# Patient Record
Sex: MALE | NOT HISPANIC OR LATINO | Employment: FULL TIME | URBAN - METROPOLITAN AREA
[De-identification: names, ages, dates, MRNs, and addresses within clinical notes are randomized per-mention and may not be internally consistent; named-entity substitution may affect disease eponyms.]

---

## 2017-08-16 DIAGNOSIS — E78.5 HYPERLIPIDEMIA LDL GOAL <130: ICD-10-CM

## 2017-08-16 RX ORDER — SIMVASTATIN 20 MG
20 TABLET ORAL AT BEDTIME
Qty: 30 TABLET | Refills: 0 | Status: SHIPPED | OUTPATIENT
Start: 2017-08-16 | End: 2017-09-12

## 2017-09-06 DIAGNOSIS — R73.9 HYPERGLYCEMIA: ICD-10-CM

## 2017-09-06 DIAGNOSIS — E78.5 HYPERLIPIDEMIA LDL GOAL <130: ICD-10-CM

## 2017-09-06 DIAGNOSIS — Z12.5 SCREENING FOR PROSTATE CANCER: ICD-10-CM

## 2017-09-06 DIAGNOSIS — Z00.00 ROUTINE GENERAL MEDICAL EXAMINATION AT A HEALTH CARE FACILITY: ICD-10-CM

## 2017-09-06 LAB
ALBUMIN SERPL-MCNC: 4 G/DL (ref 3.4–5)
ALBUMIN UR-MCNC: 30 MG/DL
ALP SERPL-CCNC: 63 U/L (ref 40–150)
ALT SERPL W P-5'-P-CCNC: 27 U/L (ref 0–70)
ANION GAP SERPL CALCULATED.3IONS-SCNC: 6 MMOL/L (ref 3–14)
APPEARANCE UR: CLEAR
AST SERPL W P-5'-P-CCNC: 17 U/L (ref 0–45)
BACTERIA #/AREA URNS HPF: ABNORMAL /HPF
BILIRUB SERPL-MCNC: 1.1 MG/DL (ref 0.2–1.3)
BILIRUB UR QL STRIP: ABNORMAL
BUN SERPL-MCNC: 10 MG/DL (ref 7–30)
CALCIUM SERPL-MCNC: 8.9 MG/DL (ref 8.5–10.1)
CHLORIDE SERPL-SCNC: 107 MMOL/L (ref 94–109)
CHOLEST SERPL-MCNC: 136 MG/DL
CO2 SERPL-SCNC: 28 MMOL/L (ref 20–32)
COLOR UR AUTO: YELLOW
CREAT SERPL-MCNC: 0.94 MG/DL (ref 0.66–1.25)
ERYTHROCYTE [DISTWIDTH] IN BLOOD BY AUTOMATED COUNT: 12.6 % (ref 10–15)
GFR SERPL CREATININE-BSD FRML MDRD: 83 ML/MIN/1.7M2
GLUCOSE SERPL-MCNC: 99 MG/DL (ref 70–99)
GLUCOSE UR STRIP-MCNC: NEGATIVE MG/DL
HBA1C MFR BLD: 5.9 % (ref 4.3–6)
HCT VFR BLD AUTO: 42.3 % (ref 40–53)
HDLC SERPL-MCNC: 38 MG/DL
HGB BLD-MCNC: 14.3 G/DL (ref 13.3–17.7)
HGB UR QL STRIP: NEGATIVE
KETONES UR STRIP-MCNC: 40 MG/DL
LDLC SERPL CALC-MCNC: 81 MG/DL
LEUKOCYTE ESTERASE UR QL STRIP: NEGATIVE
MCH RBC QN AUTO: 31.4 PG (ref 26.5–33)
MCHC RBC AUTO-ENTMCNC: 33.8 G/DL (ref 31.5–36.5)
MCV RBC AUTO: 93 FL (ref 78–100)
MUCOUS THREADS #/AREA URNS LPF: PRESENT /LPF
NITRATE UR QL: NEGATIVE
NON-SQ EPI CELLS #/AREA URNS LPF: ABNORMAL /LPF
NONHDLC SERPL-MCNC: 98 MG/DL
PH UR STRIP: 6 PH (ref 5–7)
PLATELET # BLD AUTO: 238 10E9/L (ref 150–450)
POTASSIUM SERPL-SCNC: 4.4 MMOL/L (ref 3.4–5.3)
PROT SERPL-MCNC: 6.8 G/DL (ref 6.8–8.8)
PSA SERPL-ACNC: 1.21 UG/L (ref 0–4)
RBC # BLD AUTO: 4.55 10E12/L (ref 4.4–5.9)
RBC #/AREA URNS AUTO: ABNORMAL /HPF
SODIUM SERPL-SCNC: 141 MMOL/L (ref 133–144)
SOURCE: ABNORMAL
SP GR UR STRIP: 1.02 (ref 1–1.03)
TRIGL SERPL-MCNC: 85 MG/DL
UROBILINOGEN UR STRIP-ACNC: 1 EU/DL (ref 0.2–1)
WBC # BLD AUTO: 9.1 10E9/L (ref 4–11)
WBC #/AREA URNS AUTO: ABNORMAL /HPF

## 2017-09-06 PROCEDURE — 36415 COLL VENOUS BLD VENIPUNCTURE: CPT | Performed by: FAMILY MEDICINE

## 2017-09-06 PROCEDURE — 81001 URINALYSIS AUTO W/SCOPE: CPT | Performed by: FAMILY MEDICINE

## 2017-09-06 PROCEDURE — 85027 COMPLETE CBC AUTOMATED: CPT | Performed by: FAMILY MEDICINE

## 2017-09-06 PROCEDURE — 80053 COMPREHEN METABOLIC PANEL: CPT | Performed by: FAMILY MEDICINE

## 2017-09-06 PROCEDURE — G0103 PSA SCREENING: HCPCS | Performed by: FAMILY MEDICINE

## 2017-09-06 PROCEDURE — 80061 LIPID PANEL: CPT | Performed by: FAMILY MEDICINE

## 2017-09-06 PROCEDURE — 83036 HEMOGLOBIN GLYCOSYLATED A1C: CPT | Performed by: FAMILY MEDICINE

## 2017-09-12 ENCOUNTER — OFFICE VISIT (OUTPATIENT)
Dept: FAMILY MEDICINE | Facility: CLINIC | Age: 57
End: 2017-09-12
Payer: COMMERCIAL

## 2017-09-12 VITALS
HEART RATE: 66 BPM | BODY MASS INDEX: 26.84 KG/M2 | HEIGHT: 67 IN | OXYGEN SATURATION: 99 % | DIASTOLIC BLOOD PRESSURE: 74 MMHG | TEMPERATURE: 97.6 F | WEIGHT: 171 LBS | SYSTOLIC BLOOD PRESSURE: 116 MMHG

## 2017-09-12 DIAGNOSIS — Z23 NEED FOR PROPHYLACTIC VACCINATION AND INOCULATION AGAINST INFLUENZA: ICD-10-CM

## 2017-09-12 DIAGNOSIS — E78.5 HYPERLIPIDEMIA LDL GOAL <130: ICD-10-CM

## 2017-09-12 DIAGNOSIS — Z71.89 ADVANCED DIRECTIVES, COUNSELING/DISCUSSION: ICD-10-CM

## 2017-09-12 DIAGNOSIS — Z00.00 ROUTINE GENERAL MEDICAL EXAMINATION AT A HEALTH CARE FACILITY: Primary | ICD-10-CM

## 2017-09-12 DIAGNOSIS — Z72.0 TOBACCO ABUSE: ICD-10-CM

## 2017-09-12 DIAGNOSIS — R73.03 PREDIABETES: ICD-10-CM

## 2017-09-12 PROCEDURE — 90471 IMMUNIZATION ADMIN: CPT | Performed by: FAMILY MEDICINE

## 2017-09-12 PROCEDURE — 99396 PREV VISIT EST AGE 40-64: CPT | Mod: 25 | Performed by: FAMILY MEDICINE

## 2017-09-12 PROCEDURE — 90686 IIV4 VACC NO PRSV 0.5 ML IM: CPT | Performed by: FAMILY MEDICINE

## 2017-09-12 RX ORDER — BUPROPION HYDROCHLORIDE 150 MG/1
TABLET, EXTENDED RELEASE ORAL
Qty: 60 TABLET | Refills: 5 | Status: SHIPPED | OUTPATIENT
Start: 2017-09-12 | End: 2018-09-14

## 2017-09-12 RX ORDER — SIMVASTATIN 20 MG
20 TABLET ORAL AT BEDTIME
Qty: 90 TABLET | Refills: 3 | Status: SHIPPED | OUTPATIENT
Start: 2017-09-12 | End: 2018-09-14

## 2017-09-12 NOTE — MR AVS SNAPSHOT
After Visit Summary   9/12/2017    Humphrey Beckham    MRN: [id number]           Patient Information     Date Of Birth          1960        Visit Information        Provider Department      9/12/2017 5:50 PM James Dewitt MD [facility]        Today's Diagnoses     Routine general medical examination at a health care facility    -  1    Tobacco abuse        Hyperlipidemia LDL goal <130        Prediabetes        Need for prophylactic vaccination and inoculation against influenza        Advanced directives, counseling/discussion           Follow-ups after your visit        Who to contact     If you have questions or need follow up information about today's clinic visit or your schedule please contact [facility] directly at [phone].  Normal or non-critical lab and imaging results will be communicated to you by MyChart, letter or phone within 4 business days after the clinic has received the results. If you do not hear from us within 7 days, please contact the clinic through Proximagenhart or phone. If you have a critical or abnormal lab result, we will notify you by phone as soon as possible.  Submit refill requests through [employer] or call your pharmacy and they will forward the refill request to us. Please allow 3 business days for your refill to be completed.          Additional Information About Your Visit        MyChart Information     [employer] gives you secure access to your electronic health record. If you see a primary care provider, you can also send messages to your care team and make appointments. If you have questions, please call your primary care clinic.  If you do not have a primary care provider, please call [phone] and they will assist you.        Care EveryWhere ID     This is your Care EveryWhere ID. This could be used by other organizations to access your [place] medical records  [id number]        Your Vitals Were     Pulse Temperature Height  "Pulse Oximetry BMI (Body Mass Index)       66 97.6  F (36.4  C) (Oral) 5' 7\" (1.702 m) 99% 26.78 kg/m2        Blood Pressure from Last 3 Encounters:   09/12/17 116/74   08/12/16 124/77   08/07/15 120/78    Weight from Last 3 Encounters:   09/12/17 171 lb (77.6 kg)   08/12/16 173 lb (78.5 kg)   08/07/15 174 lb (78.9 kg)              We Performed the Following     FLU VAC, SPLIT VIRUS IM > 3 YO (QUADRIVALENT) [19032]     Vaccine Administration, Initial [75977]          Today's Medication Changes          These changes are accurate as of: 9/12/17  7:29 PM.  If you have any questions, ask your nurse or doctor.               Start taking these medicines.        Dose/Directions    buPROPion 150 MG 12 hr tablet   Commonly known as:  WELLBUTRIN SR   Used for:  Tobacco abuse   Started by:  James Dewitt MD        Take 1 tablet once daily and increase to 1 tablet twice daily after 4 to 7 days for smoking   Quantity:  60 tablet   Refills:  5         These medicines have changed or have updated prescriptions.        Dose/Directions    simvastatin 20 MG tablet   Commonly known as:  ZOCOR   This may have changed:  additional instructions   Used for:  Hyperlipidemia LDL goal <130   Changed by:  James Dewitt MD        Dose:  20 mg   Take 1 tablet (20 mg) by mouth At Bedtime For cholesterol Pharmacy ok to hold prescription until due   Quantity:  90 tablet   Refills:  3            Where to get your medicines      These medications were sent to [id number] - [place], [place] - [address]  [address] [place] [address]     Phone:  [phone]     buPROPion 150 MG 12 hr tablet    simvastatin 20 MG tablet                Primary Care Provider Office Phone # Fax #    James Dewitt [phone] [address] [facility] [address]        Equal Access to Services     SEGUNDO SWENSON AH: Zahraa leyvao Soomaali, waaxda luqadaha, qaybta kaaljaden monzon " keyur mileslatriceamina ruizJoseaahank ah. So [facility] [phone].    ATENCIÓN: Si habla augustus, tiene a blancas disposición servicios gratuitos de asistencia lingüística. Wendy al [phone].    We comply with applicable federal civil rights laws and [place] laws. We do not discriminate on the basis of race, color, national origin, age, disability sex, sexual orientation or gender identity.            Thank you!     Thank you for choosing [facility]  for your care. Our goal is always to provide you with excellent care. Hearing back from our patients is one way we can continue to improve our services. Please take a few minutes to complete the written survey that you may receive in the mail after your visit with us. Thank you!             Your Updated Medication List - Protect others around you: Learn how to safely use, store and throw away your medicines at www.disposemymeds.org.          This list is accurate as of: 9/12/17  7:29 PM.  Always use your most recent med list.                   Brand Name Dispense Instructions for use Diagnosis    ASPIRIN PO      Take 81 mg by mouth daily        buPROPion 150 MG 12 hr tablet    WELLBUTRIN SR    60 tablet    Take 1 tablet once daily and increase to 1 tablet twice daily after 4 to 7 days for smoking    Tobacco abuse       simvastatin 20 MG tablet    ZOCOR    90 tablet    Take 1 tablet (20 mg) by mouth At Bedtime For cholesterol Pharmacy ok to hold prescription until due    Hyperlipidemia LDL goal <130

## 2017-09-12 NOTE — NURSING NOTE
"Chief Complaint   Patient presents with     Physical       Initial /74 (BP Location: Left arm, Patient Position: Sitting, Cuff Size: Adult Regular)  Pulse 66  Temp 97.6  F (36.4  C) (Oral)  Ht 5' 7\" (1.702 m)  Wt 171 lb (77.6 kg)  SpO2 99%  BMI 26.78 kg/m2 Estimated body mass index is 26.78 kg/(m^2) as calculated from the following:    Height as of this encounter: 5' 7\" (1.702 m).    Weight as of this encounter: 171 lb (77.6 kg).  Medication Reconciliation: complete   Lynsey Davis CMA    "

## 2017-09-12 NOTE — PROGRESS NOTES
SUBJECTIVE:   CC: Humphrey Beckham is an 57 year old male who presents for preventative health visit.   Follow-up hyperglycemia and high cholesterol. Dad with dm - late 50's. Limited pop. Whole grain. Exercise regularly.   Smoking 1/2 ppd. No chantix or zyban. No history seizure.   82yo  pneumonia. Brother -uncontrolled dm/dialysis.   2 cups coffee/day. No ALCOHOL.   Physical   Annual:     Getting at least 3 servings of Calcium per day::  Yes    Bi-annual eye exam::  Yes    Dental care twice a year::  Yes    Sleep apnea or symptoms of sleep apnea::  None    Diet::  Regular (no restrictions)    Frequency of exercise::  2-3 days/week    Duration of exercise::  15-30 minutes    Taking medications regularly::  Yes    Medication side effects::  Not applicable    Additional concerns today::  No          PROBLEMS TO ADD ON...    Today's PHQ-2 Score:   PHQ-2 (  [employer]) 2017   Q1: Little interest or pleasure in doing things 0   Q2: Feeling down, depressed or hopeless 0   PHQ-2 Score 0   Q1: Little interest or pleasure in doing things Not at all   Q2: Feeling down, depressed or hopeless Not at all   PHQ-2 Score 0       Abuse: Current or Past(Physical, Sexual or Emotional)- No  Do you feel safe in your environment - Yes    Social History   Substance Use Topics     Smoking status: Current Every Day Smoker     Packs/day: 0.50     Types: Cigarettes     Smokeless tobacco: Never Used     Alcohol use Yes      Comment: rare     The patient does not drink >3 drinks per day nor >7 drinks per week.    Last PSA:   PSA   Date Value Ref Range Status   2017 1.21 0 - 4 ug/L Final     Comment:     Assay Method:  Chemiluminescence using Siemens Vista analyzer       Reviewed orders with patient. Reviewed health maintenance and updated orders accordingly - Yes  Labs reviewed in EPIC    Reviewed and updated as needed this visit by clinical staff         Reviewed and updated as needed this visit by Provider            ROS:  C:  "NEGATIVE for fever, chills, change in weight  I: NEGATIVE for worrisome rashes, moles or lesions  E: NEGATIVE for vision changes or irritation. Glasses - full exam last year ok.   ENT: NEGATIVE for ear, mouth and throat problems. No major snoring.   R: NEGATIVE for significant cough or SOB  B: NEGATIVE for masses, tenderness or discharge  CV: NEGATIVE for chest pain, palpitations or peripheral edema. Good exercise tolerance  GI: NEGATIVE for nausea, abdominal pain, heartburn, or change in bowel habits. No black or bloody stools.    male: negative for dysuria, hematuria, decreased urinary stream, erectile dysfunction, urethral discharge. No nocturia.   M: NEGATIVE for significant arthralgias or myalgia  N: NEGATIVE for weakness, dizziness or paresthesias  E: NEGATIVE for temperature intolerance, skin/hair changes  H: NEGATIVE for bleeding problems  P: NEGATIVE for changes in mood or affect. Sleep ok. Emotionally ok.     OBJECTIVE:   There were no vitals taken for this visit.   /74 (BP Location: Left arm, Patient Position: Sitting, Cuff Size: Adult Regular)  Pulse 66  Temp 97.6  F (36.4  C) (Oral)  Ht 5' 7\" (1.702 m)  Wt 171 lb (77.6 kg)  SpO2 99%  BMI 26.78 kg/m2      EXAM:  GENERAL: healthy, alert and no distress  EYES: Eyes grossly normal to inspection, PERRL and conjunctivae and sclerae normal  HENT: ear canals and TM's normal, nose and mouth without ulcers or lesions  NECK: no adenopathy, no asymmetry, masses, or scars and thyroid normal to palpation  RESP: lungs clear to auscultation - no rales, rhonchi or wheezes  BREAST: normal without masses, tenderness or nipple discharge and no palpable axillary masses or adenopathy  CV: regular rate and rhythm, normal S1 S2, no S3 or S4, no murmur, click or rub, no peripheral edema and peripheral pulses strong  ABDOMEN: soft, nontender, no hepatosplenomegaly, no masses and bowel sounds normal   (male): normal male genitalia without lesions or urethral " discharge, no hernia  RECTAL (male): patient deferred exam  MS: no gross musculoskeletal defects noted, no edema  SKIN: no suspicious lesions or rashes  NEURO: Normal strength and tone, mentation intact and speech normal  BACK: no CVA tenderness, no paralumbar tenderness  PSYCH: mentation appears normal, affect normal/bright  LYMPH: no cervical, supraclavicular, axillary, or inguinal adenopathy    ASSESSMENT/PLAN:   ASSESSMENT / PLAN:  (Z00.00) Routine general medical examination at a health care facility  (primary encounter diagnosis)  Comment: generally healthy and normal exam/labs  Plan: .Reviewed self mole/testicle check handout.  Call/email with questions/concerns.     (Z72.0) Tobacco abuse  Comment: needs help  Plan: buPROPion (WELLBUTRIN SR) 150 MG 12 hr tablet        Reveiwed risks and side effects of medication  If SUICIAL IDEATION OR HOMOCIDAL IDEATION OR GOLDY TO ER. Can do over the counter nicotice supplement too. chantix is an option too. Call/email with questions/concerns    (E78.5) Hyperlipidemia LDL goal <130  Comment: stable  Plan: simvastatin (ZOCOR) 20 MG tablet        Reveiwed risks and side effects of medication  Continue exercise. Blood pressure ok. Chest pain or shortness of breath to er    (R73.03) Prediabetes  Plan: family history dm. Diet/exercise. Recheck one year.     (Z23) Need for prophylactic vaccination and inoculation against influenza  Com  Plan: FLU VAC, SPLIT VIRUS IM > 3 YO (QUADRIVALENT)         [12159], Vaccine Administration, Initial         [71809]            (Z71.89) Advanced directives, counseling/discussion          COUNSELING:   Reviewed preventive health counseling, as reflected in patient instructions       Regular exercise       Healthy diet/nutrition       Vision screening       Colon cancer screening       Prostate cancer screening       Osteoporosis Prevention/Bone Health             reports that he has been smoking Cigarettes.  He has been smoking about 0.50 packs  "per day. He has never used smokeless tobacco.      Estimated body mass index is 26.7 kg/(m^2) as calculated from the following:    Height as of 8/12/16: 5' 7.5\" (1.715 m).    Weight as of 8/12/16: 173 lb (78.5 kg).         Counseling Resources:  ATP IV Guidelines  Pooled Cohorts Equation Calculator  FRAX Risk Assessment  ICSI Preventive Guidelines  Dietary Guidelines for Americans, 2010  USDA's MyPlate  ASA Prophylaxis  Lung CA Screening    James Dewitt MD  [facility] for HPI/ROS submitted by the patient on 9/11/2017   PHQ-2 Score: 0    Injectable Influenza Immunization Documentation    1.  Are you sick today? (Fever of 100.5 or higher on the day of the clinic)   No    2.  Have you ever had Guillain-Stoddard Syndrome within 6 weeks of an influenza vaccionation?  No    3. Do you have a life-threatening allergy to eggs?  No    4. Do you have a life-threatening allergy to a component of the vaccine? May include antibiotics, gelatin or latex.  No     5. Have you ever had a reaction to a dose of flu vaccine that needed immediate medical attention?  No     Form completed by Lynsey Davis CMA    "

## 2018-08-09 ENCOUNTER — DOCUMENTATION ONLY (OUTPATIENT)
Dept: LAB | Facility: CLINIC | Age: 58
End: 2018-08-09

## 2018-08-09 DIAGNOSIS — Z00.00 ROUTINE GENERAL MEDICAL EXAMINATION AT A HEALTH CARE FACILITY: ICD-10-CM

## 2018-08-09 DIAGNOSIS — R73.03 PREDIABETES: ICD-10-CM

## 2018-08-09 DIAGNOSIS — Z12.5 SCREENING FOR PROSTATE CANCER: ICD-10-CM

## 2018-08-09 DIAGNOSIS — E78.5 HYPERLIPIDEMIA LDL GOAL <130: Primary | ICD-10-CM

## 2018-08-09 NOTE — PROGRESS NOTES
.Please place or confirm pending lab orders for upcoming lab appointment on 9/7/18  Thank you,  Amanda

## 2018-09-07 DIAGNOSIS — R73.03 PREDIABETES: ICD-10-CM

## 2018-09-07 DIAGNOSIS — Z00.00 ROUTINE GENERAL MEDICAL EXAMINATION AT A HEALTH CARE FACILITY: ICD-10-CM

## 2018-09-07 DIAGNOSIS — Z12.5 SCREENING FOR PROSTATE CANCER: ICD-10-CM

## 2018-09-07 DIAGNOSIS — E78.5 HYPERLIPIDEMIA LDL GOAL <130: ICD-10-CM

## 2018-09-07 LAB
ANION GAP SERPL CALCULATED.3IONS-SCNC: 8 MMOL/L (ref 3–14)
BUN SERPL-MCNC: 12 MG/DL (ref 7–30)
CALCIUM SERPL-MCNC: 9.1 MG/DL (ref 8.5–10.1)
CHLORIDE SERPL-SCNC: 103 MMOL/L (ref 94–109)
CHOLEST SERPL-MCNC: 140 MG/DL
CO2 SERPL-SCNC: 28 MMOL/L (ref 20–32)
CREAT SERPL-MCNC: 0.92 MG/DL (ref 0.66–1.25)
GFR SERPL CREATININE-BSD FRML MDRD: 85 ML/MIN/1.7M2
GLUCOSE SERPL-MCNC: 112 MG/DL (ref 70–99)
HBA1C MFR BLD: 6.1 % (ref 0–5.6)
HDLC SERPL-MCNC: 38 MG/DL
LDLC SERPL CALC-MCNC: 65 MG/DL
NONHDLC SERPL-MCNC: 102 MG/DL
POTASSIUM SERPL-SCNC: 4.3 MMOL/L (ref 3.4–5.3)
PSA SERPL-ACNC: 1.17 UG/L (ref 0–4)
SODIUM SERPL-SCNC: 139 MMOL/L (ref 133–144)
TRIGL SERPL-MCNC: 186 MG/DL

## 2018-09-07 PROCEDURE — 83036 HEMOGLOBIN GLYCOSYLATED A1C: CPT | Performed by: FAMILY MEDICINE

## 2018-09-07 PROCEDURE — G0103 PSA SCREENING: HCPCS | Performed by: FAMILY MEDICINE

## 2018-09-07 PROCEDURE — 36415 COLL VENOUS BLD VENIPUNCTURE: CPT | Performed by: FAMILY MEDICINE

## 2018-09-07 PROCEDURE — 80048 BASIC METABOLIC PNL TOTAL CA: CPT | Performed by: FAMILY MEDICINE

## 2018-09-07 PROCEDURE — 80061 LIPID PANEL: CPT | Performed by: FAMILY MEDICINE

## 2018-09-14 ENCOUNTER — OFFICE VISIT (OUTPATIENT)
Dept: FAMILY MEDICINE | Facility: CLINIC | Age: 58
End: 2018-09-14
Payer: COMMERCIAL

## 2018-09-14 VITALS
RESPIRATION RATE: 16 BRPM | HEIGHT: 67 IN | TEMPERATURE: 98 F | HEART RATE: 71 BPM | WEIGHT: 169 LBS | SYSTOLIC BLOOD PRESSURE: 129 MMHG | BODY MASS INDEX: 26.53 KG/M2 | DIASTOLIC BLOOD PRESSURE: 76 MMHG | OXYGEN SATURATION: 98 %

## 2018-09-14 DIAGNOSIS — Z23 NEED FOR PROPHYLACTIC VACCINATION AND INOCULATION AGAINST INFLUENZA: ICD-10-CM

## 2018-09-14 DIAGNOSIS — Z00.00 ROUTINE GENERAL MEDICAL EXAMINATION AT A HEALTH CARE FACILITY: Primary | ICD-10-CM

## 2018-09-14 DIAGNOSIS — E78.5 HYPERLIPIDEMIA LDL GOAL <130: ICD-10-CM

## 2018-09-14 PROCEDURE — 90682 RIV4 VACC RECOMBINANT DNA IM: CPT | Performed by: FAMILY MEDICINE

## 2018-09-14 PROCEDURE — 90471 IMMUNIZATION ADMIN: CPT | Performed by: FAMILY MEDICINE

## 2018-09-14 PROCEDURE — 99396 PREV VISIT EST AGE 40-64: CPT | Mod: 25 | Performed by: FAMILY MEDICINE

## 2018-09-14 RX ORDER — SIMVASTATIN 20 MG
20 TABLET ORAL AT BEDTIME
Qty: 90 TABLET | Refills: 3 | Status: SHIPPED | OUTPATIENT
Start: 2018-09-14 | End: 2019-09-14

## 2018-09-14 NOTE — PATIENT INSTRUCTIONS
1. I recommend including veggies, fresh fruits (3 to 5 servings), nuts (unsalted) in your daily diet. Cut down on carbs like bread, pasta, rice, potatoes. Cut down on red meats like burgers etc. Increase healthy proteins like beans, tofu, tuna, chicken and turkey.    2. Get regular exercise like jogging, biking, swimming at least 3 times per week.      3. Avoid the sun or otherwise use sun screen - please look at the insert I gave you about melanoma.    4. See the dentist and eye doctor regularly.    5. Please call [phone] to set up an appointment to discuss advanced directive. Or send a copy that you have at home.     6. Let me know if you want to try Chantix.    7. Stop by our pharmacy and get the Shingles vaccine.    8. If all is well, see you back in 1 year for a physical with fasting labs.

## 2018-09-14 NOTE — NURSING NOTE
"Chief Complaint   Patient presents with     Physical       Initial /76 (Cuff Size: Adult Regular)  Pulse 71  Temp 98  F (36.7  C) (Oral)  Resp 16  Ht 5' 7.25\" (1.708 m)  Wt 169 lb (76.7 kg)  SpO2 98%  BMI 26.27 kg/m2 Estimated body mass index is 26.27 kg/(m^2) as calculated from the following:    Height as of this encounter: 5' 7.25\" (1.708 m).    Weight as of this encounter: 169 lb (76.7 kg).      Rebekah Castelan LPN    "

## 2018-09-14 NOTE — MR AVS SNAPSHOT
After Visit Summary   9/14/2018    Humphrey Beckham    MRN: [id number]           Patient Information     Date Of Birth          1960        Visit Information        Provider Department      9/14/2018 3:30 PM Ben Cross MD [facility]        Today's Diagnoses     Need for prophylactic vaccination and inoculation against influenza    -  1    Hyperlipidemia LDL goal <130          Care Instructions    1. I recommend including veggies, fresh fruits (3 to 5 servings), nuts (unsalted) in your daily diet. Cut down on carbs like bread, pasta, rice, potatoes. Cut down on red meats like burgers etc. Increase healthy proteins like beans, tofu, tuna, chicken and turkey.    2. Get regular exercise like jogging, biking, swimming at least 3 times per week.      3. Avoid the sun or otherwise use sun screen - please look at the insert I gave you about melanoma.    4. See the dentist and eye doctor regularly.    5. Please call [phone] to set up an appointment to discuss advanced directive. Or send a copy that you have at home.     6. Let me know if you want to try Chantix.    7. Stop by our pharmacy and get the Shingles vaccine.    8. If all is well, see you back in 1 year for a physical with fasting labs.              Follow-ups after your visit        Who to contact     If you have questions or need follow up information about today's clinic visit or your schedule please contact [facility] directly at [phone].  Normal or non-critical lab and imaging results will be communicated to you by MyChart, letter or phone within 4 business days after the clinic has received the results. If you do not hear from us within 7 days, please contact the clinic through Balancedhart or phone. If you have a critical or abnormal lab result, we will notify you by phone as soon as possible.  Submit refill requests through [email or web address] or call your pharmacy and they will forward the refill request to us.  "Please allow 3 business days for your refill to be completed.          Additional Information About Your Visit        RadioScapehart Information     [employer] gives you secure access to your electronic health record. If you see a primary care provider, you can also send messages to your care team and make appointments. If you have questions, please call your primary care clinic.  If you do not have a primary care provider, please call [phone] and they will assist you.        Care EveryWhere ID     This is your Care EveryWhere ID. This could be used by other organizations to access your [place] medical records  [id number]        Your Vitals Were     Pulse Temperature Respirations Height Pulse Oximetry BMI (Body Mass Index)    71 98  F (36.7  C) (Oral) 16 5' 7.25\" (1.708 m) 98% 26.27 kg/m2       Blood Pressure from Last 3 Encounters:   09/14/18 129/76   09/12/17 116/74   08/12/16 124/77    Weight from Last 3 Encounters:   09/14/18 169 lb (76.7 kg)   09/12/17 171 lb (77.6 kg)   08/12/16 173 lb (78.5 kg)              We Performed the Following     FLU VACCINE, (RIV4) RECOMBINANT HA  , IM (FluBlok, egg free) [19316]- >18 YRS (FM[facility] recommended  50-64 YRS)     Vaccine Administration, Initial [47982]          Today's Medication Changes          These changes are accurate as of 9/14/18  4:10 PM.  If you have any questions, ask your nurse or doctor.               These medicines have changed or have updated prescriptions.        Dose/Directions    simvastatin 20 MG tablet   Commonly known as:  ZOCOR   This may have changed:  additional instructions   Used for:  Hyperlipidemia LDL goal <130   Changed by:  Ben Cross MD        Dose:  20 mg   Take 1 tablet (20 mg) by mouth At Bedtime   Quantity:  90 tablet   Refills:  3            Where to get your medicines      These medications were sent to [id number] - [place] - [address]  [address] [address]     Phone:  [phone]     " simvastatin 20 MG tablet                Primary Care Provider Office Phone # Fax #    Ben Cross [phone] [address] [address]        Equal Access to Services     SEGUNDO SWENSON : Hadii samy ku hadxiaoo Soomaali, waaxda luqadaha, qaybta kaalmada adeegyada, jaden moran fraciscojessenia morejon laJosepetra diop. So [facility] [phone].    ATENCIÓN: Si habla español, tiene a blancas disposición servicios gratuitos de asistencia lingüística. Llame al [phone].    We comply with applicable federal civil rights laws and [place] laws. We do not discriminate on the basis of race, color, national origin, age, disability, sex, sexual orientation, or gender identity.            Thank you!     Thank you for choosing [facility]  for your care. Our goal is always to provide you with excellent care. Hearing back from our patients is one way we can continue to improve our services. Please take a few minutes to complete the written survey that you may receive in the mail after your visit with us. Thank you!             Your Updated Medication List - Protect others around you: Learn how to safely use, store and throw away your medicines at www.disposemymeds.org.          This list is accurate as of 9/14/18  4:10 PM.  Always use your most recent med list.                   Brand Name Dispense Instructions for use Diagnosis    ASPIRIN PO      Take 81 mg by mouth daily        simvastatin 20 MG tablet    ZOCOR    90 tablet    Take 1 tablet (20 mg) by mouth At Bedtime    Hyperlipidemia LDL goal <130

## 2018-09-14 NOTE — PROGRESS NOTES
SUBJECTIVE:   CC: Humphrey Beckham is an 58 year old male who presents for preventative health visit.     Healthy Habits:    Do you get at least three servings of calcium containing foods daily (dairy, green leafy vegetables, etc.)? yes    Amount of exercise or daily activities, outside of work: 2 day(s) per week    Problems taking medications regularly No    Medication side effects: No    Have you had an eye exam in the past two years? yes    Do you see a dentist twice per year? yes    Do you have sleep apnea, excessive snoring or daytime drowsiness?no    Hip pain left - off and on since Jan 2015. No trauma. Feels better when active and hurts later.   Evaluation and treatment:    Most likely bursitis. Improved with stretching and chiro.    He will report worseing symptoms.    Dyslipidemia - No history of CAD, CVA, PAD or diabetes.   Evaluation and treatment:    Per ATP4, moderate intensity statin recommended.   Zocor 20 mg every day - no side effects.   Continue Zocor 20 mg qd.   Healthy weight, diet and exercise discussed.    Recent Labs   Lab Test  09/07/18   0800  09/06/17   0907   08/03/15   0815  07/09/14   0902   CHOL  140  136   < >  128  172   HDL  38*  38*   < >  32*  32*   LDL  65  81   < >  60  108   TRIG  186*  85   < >  179*  162*   CHOLHDLRATIO   --    --    --   4.0  5.4*    < > = values in this interval not displayed.     The 10-year ASCVD risk score ([place] HIN[place] Jr, et al., 2013) is: 10.2%    Values used to calculate the score:      Age: 58 years      Sex: Male      Is Non- : No      Diabetic: No      Tobacco smoker: Yes      Systolic Blood Pressure: 129 mmHg      Is BP treated: No      HDL Cholesterol: 38 mg/dL      Total Cholesterol: 140 mg/dL    Tobacco Abuse - 1/2 ppd since age 21.   Evaluation and treatment:    Was ready to quit. I had rx'd Chantix but never tried it.    Counseled. Try Chantix. Side effects discussed.   Does not qualify for lung CA screen.    Decreased  libido - he says erections are fine. But he has lost interest in sex. Also hot flushes off and on.   Evaluation and treatment:   Testosterone and TSH fine 8/12/16.    TSH   Date Value Ref Range Status   08/12/2016 1.93 0.40 - 4.00 mU/L Final       SK - right temple in the hair line. Reassured. May freeze if needed.     Left hand contracture - left 4th finger. Does not bother him. Refer to ortho if needed.    Prediabetes - diet and exercise discussed. Continue to monitor.    Last Comprehensive Metabolic Panel:  Sodium   Date Value Ref Range Status   09/07/2018 139 133 - 144 mmol/L Final     Potassium   Date Value Ref Range Status   09/07/2018 4.3 3.4 - 5.3 mmol/L Final     Chloride   Date Value Ref Range Status   09/07/2018 103 94 - 109 mmol/L Final     Carbon Dioxide   Date Value Ref Range Status   09/07/2018 28 20 - 32 mmol/L Final     Anion Gap   Date Value Ref Range Status   09/07/2018 8 3 - 14 mmol/L Final     Glucose   Date Value Ref Range Status   09/07/2018 112 (H) 70 - 99 mg/dL Final     Comment:     Fasting specimen     Urea Nitrogen   Date Value Ref Range Status   09/07/2018 12 7 - 30 mg/dL Final     Creatinine   Date Value Ref Range Status   09/07/2018 0.92 0.66 - 1.25 mg/dL Final     GFR Estimate   Date Value Ref Range Status   09/07/2018 85 >60 mL/min/1.7m2 Final     Comment:     Non  GFR Calc     Calcium   Date Value Ref Range Status   09/07/2018 9.1 8.5 - 10.1 mg/dL Final         Lab Results   Component Value Date    A1C 6.1 09/07/2018    A1C 5.9 09/06/2017    A1C 5.4 07/09/2014       GLC      118   8/6/2016  GLC      119   8/3/2015   7/9/2014      Preventive: flu shot given today. Advised to get shingrix at our pharmacy.    Immunization History   Administered Date(s) Administered     Influenza Quad, Recombinant, p-free (RIV4) 09/14/2018     Influenza Vaccine IM 3yrs+ 4 Valent IIV4 09/12/2017     TD (ADULT, 7+) 05/05/2005     TDAP Vaccine (Adacel) 07/10/2014       Pneumovax and  Prevnar n/a    Colonoscopy: 1/16/12 - repeat in 10 years    Prostate CA: discussed the limitations of testing.    PSA   Date Value Ref Range Status   09/07/2018 1.17 0 - 4 ug/L Final     Comment:     Assay Method:  Chemiluminescence using Siemens Vista analyzer     Hep C: Negative 7/10/14    Advanced Directive: he will send copy from home.    SH:    Marital status:   Kids: 3  Employment:   Exercise: volleyball 1-2 per week in the summer  Tobacco: see SH  Etoh: rarely  Recreational drugs: denies  Caffeine: 3 per day.    FH:    Father age 76 has DM2.      Today's PHQ-2 Score:   PHQ-2 ( 1999 Pfizer) 9/14/2018 9/11/2017   Q1: Little interest or pleasure in doing things 0 0   Q2: Feeling down, depressed or hopeless 0 0   PHQ-2 Score 0 0   Q1: Little interest or pleasure in doing things - Not at all   Q2: Feeling down, depressed or hopeless - Not at all   PHQ-2 Score - 0       Abuse: Current or Past(Physical, Sexual or Emotional)- No  Do you feel safe in your environment - Yes    Social History   Substance Use Topics     Smoking status: Current Every Day Smoker     Packs/day: 0.50     Types: Cigarettes     Smokeless tobacco: Never Used     Alcohol use Yes      Comment: rare      If you drink alcohol do you typically have >3 drinks per day or >7 drinks per week? No                      Last PSA:   PSA   Date Value Ref Range Status   09/07/2018 1.17 0 - 4 ug/L Final     Comment:     Assay Method:  Chemiluminescence using Siemens Vista analyzer       Reviewed orders with patient. Reviewed health maintenance and updated orders accordingly - Yes    ROS:  CONSTITUTIONAL: NEGATIVE for fever, chills, change in weight  INTEGUMENTARY/SKIN: NEGATIVE for worrisome rashes, moles or lesions  EYES: NEGATIVE for vision changes or irritation  ENT: NEGATIVE for ear, mouth and throat problems  RESP: NEGATIVE for significant cough or SOB  CV: NEGATIVE for chest pain, palpitations or peripheral edema  GI:  "NEGATIVE for nausea, abdominal pain, heartburn, or change in bowel habits   male: negative for dysuria, hematuria, decreased urinary stream, erectile dysfunction, urethral discharge  MUSCULOSKELETAL: NEGATIVE for significant arthralgias or myalgia  NEURO: NEGATIVE for weakness, dizziness or paresthesias  PSYCHIATRIC: NEGATIVE for changes in mood or affect    OBJECTIVE:   /76 (Cuff Size: Adult Regular)  Pulse 71  Temp 98  F (36.7  C) (Oral)  Resp 16  Ht 5' 7.25\" (1.708 m)  Wt 169 lb (76.7 kg)  SpO2 98%  BMI 26.27 kg/m2  EXAM:      GENERAL APPEARANCE: healthy, alert and no distress  EYES: Eyes grossly normal to inspection, PERRL and conjunctivae and sclerae normal  HENT: ear canals and TM's normal, nose and mouth without ulcers or lesions, oropharynx clear and oral mucous membranes moist  NECK: no adenopathy, no asymmetry, masses, or scars and thyroid normal to palpation  RESP: lungs clear to auscultation - no rales, rhonchi or wheezes  CV: regular rates and rhythm, normal S1 S2, no S3 or S4, no murmur, click or rub, no peripheral edema and peripheral pulses strong  ABDOMEN: soft, nontender, no hepatosplenomegaly, no masses and bowel sounds normal  MS: no musculoskeletal defects are noted and gait is age appropriate without ataxia. Left hand on volar aspect slight contracture.  SKIN: right temple with typical \"stuck on\" SK.   NEURO: Normal strength and tone, sensory exam grossly normal, mentation intact and speech normal  PSYCH: mentation appears normal and affect normal/bright      ASSESSMENT/PLAN:       COUNSELING:  Reviewed preventive health counseling, as reflected in patient instructions       Regular exercise       Healthy diet/nutrition    BP Readings from Last 1 Encounters:   09/14/18 129/76     Estimated body mass index is 26.27 kg/(m^2) as calculated from the following:    Height as of this encounter: 5' 7.25\" (1.708 m).    Weight as of this encounter: 169 lb (76.7 kg).      Weight management " plan: Discussed healthy diet and exercise guidelines and patient will follow up in 12 months in clinic to re-evaluate.     reports that he has been smoking Cigarettes.  He has been smoking about 0.50 packs per day. He has never used smokeless tobacco.  Tobacco Cessation Action Plan: Pharmacotherapies : Nicotine patch    Assessment and Plan - Decision Making    1. Routine general medical examination at a health care facility    Results of today's exam given to patient verbally along with age appropriate preventive measures. Written instructions reviewed and handed to the patient.      2. Need for prophylactic vaccination and inoculation against influenza    - FLU VACCINE, (RIV4) RECOMBINANT HA  , IM (FluBlok, egg free) [65633]- >18 YRS ([facility] recommended  50-64 YRS)  - Vaccine Administration, Initial [00742]    3. Hyperlipidemia LDL goal <130    See HPI for details.    - simvastatin (ZOCOR) 20 MG tablet; Take 1 tablet (20 mg) by mouth At Bedtime  Dispense: 90 tablet; Refill: 3      Written instructions given as follows:    Patient Instructions   1. I recommend including veggies, fresh fruits (3 to 5 servings), nuts (unsalted) in your daily diet. Cut down on carbs like bread, pasta, rice, potatoes. Cut down on red meats like burgers etc. Increase healthy proteins like beans, tofu, tuna, chicken and turkey.    2. Get regular exercise like jogging, biking, swimming at least 3 times per week.      3. Avoid the sun or otherwise use sun screen - please look at the insert I gave you about melanoma.    4. See the dentist and eye doctor regularly.    5. Please call [phone] to set up an appointment to discuss advanced directive. Or send a copy that you have at home.     6. Let me know if you want to try Chantix.    7. Stop by our pharmacy and get the Shingles vaccine.    8. If all is well, see you back in 1 year for a physical with fasting labs.            Injectable Influenza Immunization Documentation    1.  Is the person  to be vaccinated sick today?   No    2. Does the person to be vaccinated have an allergy to a component   of the vaccine?   No  Egg Allergy Algorithm Link    3. Has the person to be vaccinated ever had a serious reaction   to influenza vaccine in the past?   No    4. Has the person to be vaccinated ever had Guillain-Barré syndrome?   No    Form completed by Rebekah Castelan LPN

## 2019-08-27 ENCOUNTER — TELEPHONE (OUTPATIENT)
Dept: FAMILY MEDICINE | Facility: CLINIC | Age: 59
End: 2019-08-27

## 2019-08-27 DIAGNOSIS — Z12.5 SCREENING FOR PROSTATE CANCER: ICD-10-CM

## 2019-08-27 DIAGNOSIS — E78.5 HYPERLIPIDEMIA LDL GOAL <130: Primary | ICD-10-CM

## 2019-08-27 DIAGNOSIS — Z00.00 ROUTINE HISTORY AND PHYSICAL EXAMINATION OF ADULT: ICD-10-CM

## 2019-08-27 NOTE — TELEPHONE ENCOUNTER
Patient is scheduled for a physical with Dr Cross on 9/27/19.    Please review lab orders sign and close encounter. Zaira Morales MA/DESTINI

## 2019-09-14 DIAGNOSIS — E78.5 HYPERLIPIDEMIA LDL GOAL <130: ICD-10-CM

## 2019-09-16 DIAGNOSIS — Z12.5 SCREENING FOR PROSTATE CANCER: ICD-10-CM

## 2019-09-16 DIAGNOSIS — E78.5 HYPERLIPIDEMIA LDL GOAL <130: ICD-10-CM

## 2019-09-16 DIAGNOSIS — Z00.00 ROUTINE HISTORY AND PHYSICAL EXAMINATION OF ADULT: ICD-10-CM

## 2019-09-16 LAB
ANION GAP SERPL CALCULATED.3IONS-SCNC: 5 MMOL/L (ref 3–14)
BUN SERPL-MCNC: 14 MG/DL (ref 7–30)
CALCIUM SERPL-MCNC: 8.9 MG/DL (ref 8.5–10.1)
CHLORIDE SERPL-SCNC: 107 MMOL/L (ref 94–109)
CHOLEST SERPL-MCNC: 150 MG/DL
CO2 SERPL-SCNC: 27 MMOL/L (ref 20–32)
CREAT SERPL-MCNC: 0.86 MG/DL (ref 0.66–1.25)
GFR SERPL CREATININE-BSD FRML MDRD: >90 ML/MIN/{1.73_M2}
GLUCOSE SERPL-MCNC: 124 MG/DL (ref 70–99)
HDLC SERPL-MCNC: 38 MG/DL
LDLC SERPL CALC-MCNC: 84 MG/DL
NONHDLC SERPL-MCNC: 114 MG/DL
POTASSIUM SERPL-SCNC: 3.9 MMOL/L (ref 3.4–5.3)
PSA SERPL-ACNC: 1.13 UG/L (ref 0–4)
SODIUM SERPL-SCNC: 139 MMOL/L (ref 133–144)
TRIGL SERPL-MCNC: 153 MG/DL

## 2019-09-16 PROCEDURE — 36415 COLL VENOUS BLD VENIPUNCTURE: CPT | Performed by: FAMILY MEDICINE

## 2019-09-16 PROCEDURE — G0103 PSA SCREENING: HCPCS | Performed by: FAMILY MEDICINE

## 2019-09-16 PROCEDURE — 80048 BASIC METABOLIC PNL TOTAL CA: CPT | Performed by: FAMILY MEDICINE

## 2019-09-16 PROCEDURE — 80061 LIPID PANEL: CPT | Performed by: FAMILY MEDICINE

## 2019-09-16 RX ORDER — SIMVASTATIN 20 MG
20 TABLET ORAL AT BEDTIME
Qty: 30 TABLET | Refills: 0 | Status: SHIPPED | OUTPATIENT
Start: 2019-09-16 | End: 2019-09-23

## 2019-09-18 ASSESSMENT — ENCOUNTER SYMPTOMS
HEADACHES: 0
DIZZINESS: 0
SORE THROAT: 0
HEMATURIA: 0
COUGH: 0
HEMATOCHEZIA: 0
MYALGIAS: 1
WEAKNESS: 0
CHILLS: 1
PARESTHESIAS: 0
SHORTNESS OF BREATH: 0
FEVER: 0
DIARRHEA: 0
CONSTIPATION: 0
FREQUENCY: 0
JOINT SWELLING: 0
PALPITATIONS: 0
ABDOMINAL PAIN: 1
NAUSEA: 0
EYE PAIN: 0
NERVOUS/ANXIOUS: 0
DYSURIA: 0
ARTHRALGIAS: 1
HEARTBURN: 0

## 2019-09-23 ENCOUNTER — OFFICE VISIT (OUTPATIENT)
Dept: FAMILY MEDICINE | Facility: CLINIC | Age: 59
End: 2019-09-23
Payer: COMMERCIAL

## 2019-09-23 VITALS
RESPIRATION RATE: 18 BRPM | HEART RATE: 86 BPM | SYSTOLIC BLOOD PRESSURE: 129 MMHG | TEMPERATURE: 97.6 F | OXYGEN SATURATION: 98 % | WEIGHT: 167 LBS | DIASTOLIC BLOOD PRESSURE: 74 MMHG | BODY MASS INDEX: 26.21 KG/M2 | HEIGHT: 67 IN

## 2019-09-23 DIAGNOSIS — E78.5 HYPERLIPIDEMIA LDL GOAL <130: ICD-10-CM

## 2019-09-23 DIAGNOSIS — L57.0 ACTINIC KERATOSIS: ICD-10-CM

## 2019-09-23 DIAGNOSIS — Z00.01 ENCOUNTER FOR ROUTINE ADULT MEDICAL EXAM WITH ABNORMAL FINDINGS: Primary | ICD-10-CM

## 2019-09-23 PROCEDURE — 17110 DESTRUCTION B9 LES UP TO 14: CPT | Performed by: PHYSICIAN ASSISTANT

## 2019-09-23 PROCEDURE — 99396 PREV VISIT EST AGE 40-64: CPT | Mod: 25 | Performed by: PHYSICIAN ASSISTANT

## 2019-09-23 RX ORDER — SIMVASTATIN 20 MG
20 TABLET ORAL AT BEDTIME
Qty: 30 TABLET | Refills: 11 | Status: SHIPPED | OUTPATIENT
Start: 2019-09-23 | End: 2020-10-06

## 2019-09-23 ASSESSMENT — MIFFLIN-ST. JEOR: SCORE: 1540.1

## 2019-09-23 NOTE — PROGRESS NOTES
SUBJECTIVE:   CC: Humphrey Beckham is an 59 year old male who presents for preventive health visit.     Healthy Habits:    Do you get at least three servings of calcium containing foods daily (dairy, green leafy vegetables, etc.)? yes    Amount of exercise or daily activities, outside of work: 2-3 day(s) per week    Problems taking medications regularly No    Medication side effects: No    Have you had an eye exam in the past two years? yes    Do you see a dentist twice per year? yes    Do you have sleep apnea, excessive snoring or daytime drowsiness?no    ak lesions x 4      Today's PHQ-2 Score:   PHQ-2 ( 1999 Pfizer) 9/18/2019 9/14/2018   Q1: Little interest or pleasure in doing things 0 0   Q2: Feeling down, depressed or hopeless 0 0   PHQ-2 Score 0 0   Q1: Little interest or pleasure in doing things Not at all -   Q2: Feeling down, depressed or hopeless Not at all -   PHQ-2 Score 0 -       Abuse: Current or Past(Physical, Sexual or Emotional)- No  Do you feel safe in your environment? Yes    Social History     Tobacco Use     Smoking status: Current Every Day Smoker     Packs/day: 0.50     Types: Cigarettes     Smokeless tobacco: Never Used   Substance Use Topics     Alcohol use: Yes     Comment: rare     If you drink alcohol do you typically have >3 drinks per day or >7 drinks per week? No                      Last PSA:   PSA   Date Value Ref Range Status   09/16/2019 1.13 0 - 4 ug/L Final     Comment:     Assay Method:  Chemiluminescence using Siemens Vista analyzer       Reviewed orders with patient. Reviewed health maintenance and updated orders accordingly - Yes  Labs reviewed in EPIC  BP Readings from Last 3 Encounters:   09/23/19 129/74   09/14/18 129/76   09/12/17 116/74    Wt Readings from Last 3 Encounters:   09/23/19 75.8 kg (167 lb)   09/14/18 76.7 kg (169 lb)   09/12/17 77.6 kg (171 lb)                    Reviewed and updated as needed this visit by clinical staff  Tobacco  Allergies  Meds      "    Reviewed and updated as needed this visit by Provider            ROS:  CONSTITUTIONAL: NEGATIVE for fever, chills, change in weight  INTEGUMENTARY/SKIN: NEGATIVE for worrisome rashes, moles or lesions  EYES: NEGATIVE for vision changes or irritation  ENT: NEGATIVE for ear, mouth and throat problems  RESP: NEGATIVE for significant cough or SOB  CV: NEGATIVE for chest pain, palpitations or peripheral edema  GI: NEGATIVE for nausea, abdominal pain, heartburn, or change in bowel habits   male: negative for dysuria, hematuria, decreased urinary stream, erectile dysfunction, urethral discharge  MUSCULOSKELETAL: NEGATIVE for significant arthralgias or myalgia  NEURO: NEGATIVE for weakness, dizziness or paresthesias  PSYCHIATRIC: NEGATIVE for changes in mood or affect    OBJECTIVE:   /74   Pulse 86   Temp 97.6  F (36.4  C) (Tympanic)   Resp 18   Ht 1.708 m (5' 7.25\")   Wt 75.8 kg (167 lb)   SpO2 98%   BMI 25.96 kg/m    EXAM:  GENERAL: healthy, alert and no distress  EYES: Eyes grossly normal to inspection, PERRL and conjunctivae and sclerae normal  HENT: ear canals and TM's normal, nose and mouth without ulcers or lesions  NECK: no adenopathy, no asymmetry, masses, or scars and thyroid normal to palpation  RESP: lungs clear to auscultation - no rales, rhonchi or wheezes  CV: regular rate and rhythm, normal S1 S2, no S3 or S4, no murmur, click or rub, no peripheral edema and peripheral pulses strong  ABDOMEN: soft, nontender, no hepatosplenomegaly, no masses and bowel sounds normal  MS: no gross musculoskeletal defects noted, no edema  SKIN: no suspicious lesions or rashes  NEURO: Normal strength and tone, mentation intact and speech normal  PSYCH: mentation appears normal, affect normal/bright  ak lesions x 4.   Each lesion was treated with 2 ftc;s of cryotherapy.   Labs reviewed      ASSESSMENT/PLAN:       ICD-10-CM    1. Encounter for routine adult medical exam with abnormal findings Z00.01    2. " "Hyperlipidemia LDL goal <130 E78.5 simvastatin (ZOCOR) 20 MG tablet   3. Actinic keratosis L57.0 DESTRUCT BENIGN LESION, UP TO 14       COUNSELING:  Reviewed preventive health counseling, as reflected in patient instructions       Regular exercise       Healthy diet/nutrition    Estimated body mass index is 25.96 kg/m  as calculated from the following:    Height as of this encounter: 1.708 m (5' 7.25\").    Weight as of this encounter: 75.8 kg (167 lb).         reports that he has been smoking cigarettes. He has been smoking about 0.50 packs per day. He has never used smokeless tobacco.      Counseling Resources:  ATP IV Guidelines  Pooled Cohorts Equation Calculator  FRAX Risk Assessment  ICSI Preventive Guidelines  Dietary Guidelines for Americans, 2010  USDA's MyPlate  ASA Prophylaxis  Lung CA Screening    Ed Aggrawal PA-C  [facility] JAMES  "

## 2020-02-24 ENCOUNTER — HEALTH MAINTENANCE LETTER (OUTPATIENT)
Age: 60
End: 2020-02-24

## 2020-10-05 DIAGNOSIS — E78.5 HYPERLIPIDEMIA LDL GOAL <130: ICD-10-CM

## 2020-10-05 NOTE — TELEPHONE ENCOUNTER
Requested Prescriptions   Pending Prescriptions Disp Refills     simvastatin (ZOCOR) 20 MG tablet 30 tablet 11     Sig: Take 1 tablet (20 mg) by mouth At Bedtime   Last Written Prescription Date:  9-1-20  Last Fill Quantity: 30,  # refills: 11   Last office visit: 9/23/2019 with prescribing provider:  9-23-19   Future Office Visit:   Next 5 appointments (look out 90 days)    Oct 07, 2020  8:45 AM  Lab visit with [facility]  [facility] ([facility] [address] [place] [address]  [phone]   Oct 12, 2020  4:40 PM  MyChart Physical Adult with Ed Aggarwal PA-C  [facility] ([facility] [address] [place] [address]  [phone]                 There is no refill protocol information for this order

## 2020-10-06 NOTE — TELEPHONE ENCOUNTER
Routing refill request to provider for review/approval because:  Labs not current:  ldl    Medication pended for approval, 30 day supply with reminder.   Pt has upcoming appt with merritt Aggarwal 10/12/20

## 2020-10-07 DIAGNOSIS — Z12.5 SCREENING FOR PROSTATE CANCER: ICD-10-CM

## 2020-10-07 DIAGNOSIS — Z13.1 SCREENING FOR DIABETES MELLITUS: ICD-10-CM

## 2020-10-07 DIAGNOSIS — E78.5 HYPERLIPIDEMIA LDL GOAL <130: ICD-10-CM

## 2020-10-07 LAB
CHOLEST SERPL-MCNC: 147 MG/DL
GLUCOSE SERPL-MCNC: 120 MG/DL (ref 70–99)
HBA1C MFR BLD: 6 % (ref 0–5.6)
HDLC SERPL-MCNC: 39 MG/DL
LDLC SERPL CALC-MCNC: 86 MG/DL
NONHDLC SERPL-MCNC: 108 MG/DL
PSA SERPL-ACNC: 2.92 UG/L (ref 0–4)
TRIGL SERPL-MCNC: 109 MG/DL

## 2020-10-07 PROCEDURE — 80061 LIPID PANEL: CPT | Performed by: PHYSICIAN ASSISTANT

## 2020-10-07 PROCEDURE — G0103 PSA SCREENING: HCPCS | Performed by: PHYSICIAN ASSISTANT

## 2020-10-07 PROCEDURE — 82947 ASSAY GLUCOSE BLOOD QUANT: CPT | Performed by: PHYSICIAN ASSISTANT

## 2020-10-07 PROCEDURE — 83036 HEMOGLOBIN GLYCOSYLATED A1C: CPT | Performed by: PHYSICIAN ASSISTANT

## 2020-10-07 RX ORDER — SIMVASTATIN 20 MG
20 TABLET ORAL AT BEDTIME
Qty: 30 TABLET | Refills: 0 | Status: SHIPPED | OUTPATIENT
Start: 2020-10-07 | End: 2020-10-12

## 2020-10-12 ENCOUNTER — OFFICE VISIT (OUTPATIENT)
Dept: FAMILY MEDICINE | Facility: CLINIC | Age: 60
End: 2020-10-12
Payer: COMMERCIAL

## 2020-10-12 VITALS
HEART RATE: 73 BPM | TEMPERATURE: 98.1 F | OXYGEN SATURATION: 99 % | BODY MASS INDEX: 26.22 KG/M2 | SYSTOLIC BLOOD PRESSURE: 112 MMHG | RESPIRATION RATE: 20 BRPM | DIASTOLIC BLOOD PRESSURE: 70 MMHG | WEIGHT: 173 LBS | HEIGHT: 68 IN

## 2020-10-12 DIAGNOSIS — R10.32 LLQ ABDOMINAL PAIN: ICD-10-CM

## 2020-10-12 DIAGNOSIS — Z00.00 ROUTINE GENERAL MEDICAL EXAMINATION AT A HEALTH CARE FACILITY: Primary | ICD-10-CM

## 2020-10-12 DIAGNOSIS — K57.32 DIVERTICULITIS OF COLON: ICD-10-CM

## 2020-10-12 DIAGNOSIS — E78.5 HYPERLIPIDEMIA LDL GOAL <130: ICD-10-CM

## 2020-10-12 DIAGNOSIS — Z71.6 ENCOUNTER FOR SMOKING CESSATION COUNSELING: ICD-10-CM

## 2020-10-12 DIAGNOSIS — R97.20 INCREASED PROSTATE SPECIFIC ANTIGEN (PSA) VELOCITY: ICD-10-CM

## 2020-10-12 DIAGNOSIS — R23.2 HOT FLASH IN MALE: ICD-10-CM

## 2020-10-12 LAB
BASOPHILS # BLD AUTO: 0 10E9/L (ref 0–0.2)
BASOPHILS NFR BLD AUTO: 0.5 %
DIFFERENTIAL METHOD BLD: NORMAL
EOSINOPHIL # BLD AUTO: 0.2 10E9/L (ref 0–0.7)
EOSINOPHIL NFR BLD AUTO: 3.2 %
ERYTHROCYTE [DISTWIDTH] IN BLOOD BY AUTOMATED COUNT: 12.8 % (ref 10–15)
HCT VFR BLD AUTO: 41.7 % (ref 40–53)
HGB BLD-MCNC: 13.7 G/DL (ref 13.3–17.7)
LYMPHOCYTES # BLD AUTO: 2.3 10E9/L (ref 0.8–5.3)
LYMPHOCYTES NFR BLD AUTO: 35.6 %
MCH RBC QN AUTO: 30.9 PG (ref 26.5–33)
MCHC RBC AUTO-ENTMCNC: 32.9 G/DL (ref 31.5–36.5)
MCV RBC AUTO: 94 FL (ref 78–100)
MONOCYTES # BLD AUTO: 0.8 10E9/L (ref 0–1.3)
MONOCYTES NFR BLD AUTO: 12.8 %
NEUTROPHILS # BLD AUTO: 3.1 10E9/L (ref 1.6–8.3)
NEUTROPHILS NFR BLD AUTO: 47.9 %
PLATELET # BLD AUTO: 219 10E9/L (ref 150–450)
RBC # BLD AUTO: 4.43 10E12/L (ref 4.4–5.9)
WBC # BLD AUTO: 6.5 10E9/L (ref 4–11)

## 2020-10-12 PROCEDURE — 90682 RIV4 VACC RECOMBINANT DNA IM: CPT | Performed by: PHYSICIAN ASSISTANT

## 2020-10-12 PROCEDURE — 85025 COMPLETE CBC W/AUTO DIFF WBC: CPT | Performed by: PHYSICIAN ASSISTANT

## 2020-10-12 PROCEDURE — 90471 IMMUNIZATION ADMIN: CPT | Performed by: PHYSICIAN ASSISTANT

## 2020-10-12 PROCEDURE — 99213 OFFICE O/P EST LOW 20 MIN: CPT | Mod: 25 | Performed by: PHYSICIAN ASSISTANT

## 2020-10-12 PROCEDURE — 99000 SPECIMEN HANDLING OFFICE-LAB: CPT | Performed by: PHYSICIAN ASSISTANT

## 2020-10-12 PROCEDURE — 84403 ASSAY OF TOTAL TESTOSTERONE: CPT | Mod: 90 | Performed by: PHYSICIAN ASSISTANT

## 2020-10-12 PROCEDURE — 84270 ASSAY OF SEX HORMONE GLOBUL: CPT | Performed by: PHYSICIAN ASSISTANT

## 2020-10-12 PROCEDURE — 99396 PREV VISIT EST AGE 40-64: CPT | Mod: 25 | Performed by: PHYSICIAN ASSISTANT

## 2020-10-12 PROCEDURE — 84443 ASSAY THYROID STIM HORMONE: CPT | Performed by: PHYSICIAN ASSISTANT

## 2020-10-12 RX ORDER — VARENICLINE TARTRATE 1 MG/1
1 TABLET, FILM COATED ORAL 2 TIMES DAILY
Qty: 60 TABLET | Refills: 3 | Status: SHIPPED | OUTPATIENT
Start: 2020-10-12 | End: 2021-10-18

## 2020-10-12 RX ORDER — AMOXICILLIN 875 MG
875 TABLET ORAL 2 TIMES DAILY
Qty: 28 TABLET | Refills: 0 | Status: SHIPPED | OUTPATIENT
Start: 2020-10-12 | End: 2020-10-26

## 2020-10-12 RX ORDER — SIMVASTATIN 20 MG
20 TABLET ORAL AT BEDTIME
Qty: 90 TABLET | Refills: 3 | Status: SHIPPED | OUTPATIENT
Start: 2020-10-12 | End: 2021-10-18

## 2020-10-12 ASSESSMENT — MIFFLIN-ST. JEOR: SCORE: 1569.73

## 2020-10-12 NOTE — PROGRESS NOTES
3  SUBJECTIVE:   CC: Humphrey Beckham is an 60 year old male who presents for preventive health visit.       Patient has been advised of split billing requirements and indicates understanding: Yes  Healthy Habits:    Do you get at least three servings of calcium containing foods daily (dairy, green leafy vegetables, etc.)? yes    Amount of exercise or daily activities, outside of work: 10 minutes per day    Problems taking medications regularly No    Medication side effects: No    Have you had an eye exam in the past two years? yes    Do you see a dentist twice per year? yes    Do you have sleep apnea, excessive snoring or daytime drowsiness?no  Elevated psa velocity. Diminished libido. Occasional hot flashes.   Some LLQ bloating and mild discomfort over the past mos.  Occasional constipation. History of diverticular dx   Patient interested in smoking cessation.;  Today's PHQ-2 Score:   PHQ-2 ( 1999 Pfizer) 10/12/2020 9/18/2019   Q1: Little interest or pleasure in doing things 0 0   Q2: Feeling down, depressed or hopeless 0 0   PHQ-2 Score 0 0   Q1: Little interest or pleasure in doing things - Not at all   Q2: Feeling down, depressed or hopeless - Not at all   PHQ-2 Score - 0       Abuse: Current or Past(Physical, Sexual or Emotional)- No  Do you feel safe in your environment? Yes        Social History     Tobacco Use     Smoking status: Current Every Day Smoker     Packs/day: 0.50     Types: Cigarettes     Smokeless tobacco: Never Used   Substance Use Topics     Alcohol use: Yes     Comment: rare     If you drink alcohol do you typically have >3 drinks per day or >7 drinks per week? Not Applicable                      Last PSA:   PSA   Date Value Ref Range Status   10/07/2020 2.92 0 - 4 ug/L Final     Comment:     Assay Method:  Chemiluminescence using Siemens Vista analyzer       Reviewed orders with patient. Reviewed health maintenance and updated orders accordingly - Yes  Lab work is in process  Labs reviewed  in EPIC  BP Readings from Last 3 Encounters:   10/12/20 112/70   09/23/19 129/74   09/14/18 129/76    Wt Readings from Last 3 Encounters:   10/12/20 78.5 kg (173 lb)   09/23/19 75.8 kg (167 lb)   09/14/18 76.7 kg (169 lb)                  Patient Active Problem List   Diagnosis     HYPERLIPIDEMIA LDL GOAL <130     Tobacco abuse     Low HDL (under 40)     Contracture of hand joint     Prediabetes     Advanced directives, counseling/discussion     Past Surgical History:   Procedure Laterality Date     NO HISTORY OF SURGERY         Social History     Tobacco Use     Smoking status: Current Every Day Smoker     Packs/day: 0.50     Types: Cigarettes     Smokeless tobacco: Never Used   Substance Use Topics     Alcohol use: Yes     Comment: rare     Family History   Problem Relation Age of Onset     Cerebrovascular Disease Mother         61yo     Diabetes Father         55-61yo     Unknown/Adopted Sister      Diabetes Brother          Current Outpatient Medications   Medication Sig Dispense Refill     amoxicillin (AMOXIL) 875 MG tablet Take 1 tablet (875 mg) by mouth 2 times daily for 14 days 28 tablet 0     ASPIRIN PO Take 81 mg by mouth daily       simvastatin (ZOCOR) 20 MG tablet Take 1 tablet (20 mg) by mouth At Bedtime 90 tablet 3     varenicline (CHANTIX LAUREN) 0.5 MG X 11 & 1 MG X 42 tablet Take 0.5 mg tab daily for 3 days, THEN 0.5 mg tab twice daily for 4 days, THEN 1 mg twice daily. 53 tablet 0     varenicline (CHANTIX) 1 MG tablet Take 1 tablet (1 mg) by mouth 2 times daily 60 tablet 3     No Known Allergies  Recent Labs   Lab Test 10/07/20  0841 09/16/19  0715 09/07/18  0800 09/06/17  0907 08/12/16  1015 08/06/16  0920   A1C 6.0*  --  6.1* 5.9  --   --    LDL 86 84 65 81  --  78   HDL 39* 38* 38* 38*  --  37*   TRIG 109 153* 186* 85  --  132   ALT  --   --   --  27  --  23   CR  --  0.86 0.92 0.94  --  0.90   GFRESTIMATED  --  >90 85 83  --  87   GFRESTBLACK  --  >90 >90 >90  --  >90  African American GFR Calc    "  POTASSIUM  --  3.9 4.3 4.4  --  4.4   TSH  --   --   --   --  1.93  --         Reviewed and updated as needed this visit by clinical staff  Tobacco  Allergies  Meds   Med Hx  Surg Hx  Fam Hx  Soc Hx        Reviewed and updated as needed this visit by Provider                Past Medical History:   Diagnosis Date     High cholesterol       Past Surgical History:   Procedure Laterality Date     NO HISTORY OF SURGERY         ROS:  CONSTITUTIONAL: NEGATIVE for fever, chills, change in weight  INTEGUMENTARY/SKIN: NEGATIVE for worrisome rashes, moles or lesions  EYES: NEGATIVE for vision changes or irritation  ENT: NEGATIVE for ear, mouth and throat problems  RESP: NEGATIVE for significant cough or SOB  CV: NEGATIVE for chest pain, palpitations or peripheral edema  GI: NEGATIVE for nausea, abdominal pain, heartburn, or change in bowel habits   male: negative for dysuria, hematuria, decreased urinary stream, erectile dysfunction, urethral discharge  MUSCULOSKELETAL: NEGATIVE for significant arthralgias or myalgia  NEURO: NEGATIVE for weakness, dizziness or paresthesias  PSYCHIATRIC: NEGATIVE for changes in mood or affect    OBJECTIVE:   /70   Pulse 73   Temp 98.1  F (36.7  C) (Tympanic)   Resp 20   Ht 1.72 m (5' 7.72\")   Wt 78.5 kg (173 lb)   SpO2 99%   BMI 26.52 kg/m    EXAM:  GENERAL: healthy, alert and no distress  EYES: Eyes grossly normal to inspection, PERRL and conjunctivae and sclerae normal  HENT: ear canals and TM's normal, nose and mouth without ulcers or lesions  NECK: no adenopathy, no asymmetry, masses, or scars and thyroid normal to palpation  RESP: lungs clear to auscultation - no rales, rhonchi or wheezes  CV: regular rate and rhythm, normal S1 S2, no S3 or S4, no murmur, click or rub, no peripheral edema and peripheral pulses strong  ABDOMEN: mild tenderness in the LLQ area, without rebound, guarding, mass or organomegaly. Abdomen is soft and bowel sounds are normal.  No hernias. " "Prostate normal in size. nontender.  MS: no gross musculoskeletal defects noted, no edema  SKIN: no suspicious lesions or rashes  NEURO: Normal strength and tone, mentation intact and speech normal  PSYCH: mentation appears normal, affect normal/bright    Diagnostic Test Results:  Labs reviewed in Epic    ASSESSMENT/PLAN:       ICD-10-CM    1. Routine general medical examination at a health care facility  Z00.00 C RIV4 (FLUBLOK) VACCINE RECOMBINANT DNA PRSRV ANTIBIO FREE, IM [[id number]]   2. Hyperlipidemia LDL goal <130  E78.5 simvastatin (ZOCOR) 20 MG tablet   3. Increased prostate specific antigen (PSA) velocity  R97.20 UROLOGY ADULT REFERRAL     PSA tumor marker   4. LLQ abdominal pain  R10.32 CBC with platelets and differential   5. Encounter for smoking cessation counseling  Z71.6 varenicline (CHANTIX LAUREN) 0.5 MG X 11 & 1 MG X 42 tablet     varenicline (CHANTIX) 1 MG tablet   6. Hot flash in male  R23.2 Testosterone Free and Total     TSH with free T4 reflex   7. Diverticulitis of colon  K57.32 amoxicillin (AMOXIL) 875 MG tablet       Patient has been advised of split billing requirements and indicates understanding: Yes  COUNSELING:  Reviewed preventive health counseling, as reflected in patient instructions       Regular exercise       Healthy diet/nutrition    Estimated body mass index is 26.52 kg/m  as calculated from the following:    Height as of this encounter: 1.72 m (5' 7.72\").    Weight as of this encounter: 78.5 kg (173 lb).    Weight management plan: Discussed healthy diet and exercise guidelines    He reports that he has been smoking cigarettes. He has been smoking about 0.50 packs per day. He has never used smokeless tobacco.  Tobacco Cessation Action Plan:   Pharmacotherapies : Chantix      Counseling Resources:  ATP IV Guidelines  Pooled Cohorts Equation Calculator  FRAX Risk Assessment  ICSI Preventive Guidelines  Dietary Guidelines for Americans, 2010  USDA's MyPlate  ASA Prophylaxis  Lung CA " Screening    Ed Aggarwal PA-C  [facility]

## 2020-10-13 LAB — TSH SERPL DL<=0.005 MIU/L-ACNC: 2.38 MU/L (ref 0.4–4)

## 2020-10-15 LAB
SHBG SERPL-SCNC: 31 NMOL/L (ref 11–80)
TESTOST FREE SERPL-MCNC: 7.28 NG/DL (ref 4.7–24.4)
TESTOST SERPL-MCNC: 351 NG/DL (ref 240–950)

## 2021-09-26 ENCOUNTER — HEALTH MAINTENANCE LETTER (OUTPATIENT)
Age: 61
End: 2021-09-26

## 2021-10-08 ENCOUNTER — LAB (OUTPATIENT)
Dept: LAB | Facility: CLINIC | Age: 61
End: 2021-10-08
Payer: COMMERCIAL

## 2021-10-08 DIAGNOSIS — E78.5 HYPERLIPIDEMIA LDL GOAL <130: ICD-10-CM

## 2021-10-08 DIAGNOSIS — Z13.1 SCREENING FOR DIABETES MELLITUS: ICD-10-CM

## 2021-10-08 DIAGNOSIS — R97.20 INCREASED PROSTATE SPECIFIC ANTIGEN (PSA) VELOCITY: ICD-10-CM

## 2021-10-08 LAB
CHOLEST SERPL-MCNC: 151 MG/DL
FASTING STATUS PATIENT QL REPORTED: YES
FASTING STATUS PATIENT QL REPORTED: YES
GLUCOSE BLD-MCNC: 112 MG/DL (ref 70–99)
HDLC SERPL-MCNC: 36 MG/DL
LDLC SERPL CALC-MCNC: 93 MG/DL
NONHDLC SERPL-MCNC: 115 MG/DL
PSA SERPL-MCNC: 4.86 UG/L (ref 0–4)
TRIGL SERPL-MCNC: 112 MG/DL

## 2021-10-08 PROCEDURE — 36415 COLL VENOUS BLD VENIPUNCTURE: CPT

## 2021-10-08 PROCEDURE — 80061 LIPID PANEL: CPT

## 2021-10-08 PROCEDURE — 82947 ASSAY GLUCOSE BLOOD QUANT: CPT

## 2021-10-08 PROCEDURE — 84153 ASSAY OF PSA TOTAL: CPT

## 2021-10-18 ENCOUNTER — OFFICE VISIT (OUTPATIENT)
Dept: FAMILY MEDICINE | Facility: CLINIC | Age: 61
End: 2021-10-18
Payer: COMMERCIAL

## 2021-10-18 VITALS
RESPIRATION RATE: 20 BRPM | TEMPERATURE: 98 F | DIASTOLIC BLOOD PRESSURE: 67 MMHG | SYSTOLIC BLOOD PRESSURE: 114 MMHG | WEIGHT: 175.2 LBS | HEART RATE: 65 BPM | HEIGHT: 68 IN | BODY MASS INDEX: 26.55 KG/M2 | OXYGEN SATURATION: 98 %

## 2021-10-18 DIAGNOSIS — E78.5 HYPERLIPIDEMIA LDL GOAL <130: ICD-10-CM

## 2021-10-18 DIAGNOSIS — Z12.11 COLON CANCER SCREENING: ICD-10-CM

## 2021-10-18 DIAGNOSIS — Z72.0 TOBACCO ABUSE: ICD-10-CM

## 2021-10-18 DIAGNOSIS — Z00.00 ROUTINE GENERAL MEDICAL EXAMINATION AT A HEALTH CARE FACILITY: Primary | ICD-10-CM

## 2021-10-18 DIAGNOSIS — R97.20 ELEVATED PROSTATE SPECIFIC ANTIGEN (PSA): ICD-10-CM

## 2021-10-18 PROCEDURE — 90471 IMMUNIZATION ADMIN: CPT | Performed by: PHYSICIAN ASSISTANT

## 2021-10-18 PROCEDURE — 90682 RIV4 VACC RECOMBINANT DNA IM: CPT | Performed by: PHYSICIAN ASSISTANT

## 2021-10-18 PROCEDURE — 99396 PREV VISIT EST AGE 40-64: CPT | Mod: 25 | Performed by: PHYSICIAN ASSISTANT

## 2021-10-18 RX ORDER — SIMVASTATIN 20 MG
20 TABLET ORAL AT BEDTIME
Qty: 90 TABLET | Refills: 3 | Status: SHIPPED | OUTPATIENT
Start: 2021-10-18 | End: 2022-10-20

## 2021-10-18 RX ORDER — MULTIPLE VITAMINS W/ MINERALS TAB 9MG-400MCG
1 TAB ORAL DAILY
COMMUNITY

## 2021-10-18 ASSESSMENT — ENCOUNTER SYMPTOMS
HEARTBURN: 0
SORE THROAT: 0
FREQUENCY: 0
NERVOUS/ANXIOUS: 0
HEMATURIA: 0
ARTHRALGIAS: 1
COUGH: 0
HEADACHES: 0
JOINT SWELLING: 0
WEAKNESS: 0
SHORTNESS OF BREATH: 0
MYALGIAS: 0
FEVER: 0
CONSTIPATION: 1
DIZZINESS: 0
PARESTHESIAS: 0
CHILLS: 0
PALPITATIONS: 0
EYE PAIN: 0
DYSURIA: 0
HEMATOCHEZIA: 0
ABDOMINAL PAIN: 1
DIARRHEA: 0
NAUSEA: 0

## 2021-10-18 ASSESSMENT — MIFFLIN-ST. JEOR: SCORE: 1575.23

## 2021-10-18 NOTE — PROGRESS NOTES
SUBJECTIVE:   CC: Humphrey Beckham is an 61 year old male who presents for preventative health visit.       Patient has been advised of split billing requirements and indicates understanding: Yes  Healthy Habits:     Getting at least 3 servings of Calcium per day:  Yes    Bi-annual eye exam:  Yes    Dental care twice a year:  Yes    Sleep apnea or symptoms of sleep apnea:  None    Diet:  Regular (no restrictions)    Frequency of exercise:  1 day/week    Duration of exercise:  15-30 minutes    Medication side effects:  Not applicable    PHQ-2 Total Score: 0    Additional concerns today:  No        Today's PHQ-2 Score:   PHQ-2 ( 1999 Pfizer) 10/18/2021   Q1: Little interest or pleasure in doing things 0   Q2: Feeling down, depressed or hopeless 0   PHQ-2 Score 0   Q1: Little interest or pleasure in doing things Not at all   Q2: Feeling down, depressed or hopeless Not at all   PHQ-2 Score 0       Abuse: Current or Past(Physical, Sexual or Emotional)- No  Do you feel safe in your environment? Yes        Social History     Tobacco Use     Smoking status: Current Every Day Smoker     Packs/day: 0.50     Years: 0.00     Pack years: 0.00     Types: Cigarettes     Smokeless tobacco: Never Used   Substance Use Topics     Alcohol use: Yes     Comment: rare         Alcohol Use 10/18/2021   Prescreen: >3 drinks/day or >7 drinks/week? No   Prescreen: >3 drinks/day or >7 drinks/week? -       Last PSA:   PSA   Date Value Ref Range Status   10/07/2020 2.92 0 - 4 ug/L Final     Comment:     Assay Method:  Chemiluminescence using Siemens Vista analyzer     PSA Tumor Marker   Date Value Ref Range Status   10/08/2021 4.86 (H) 0.00 - 4.00 ug/L Final       Reviewed orders with patient. Reviewed health maintenance and updated orders accordingly - Yes  Lab work is in process  Labs reviewed in EPIC  BP Readings from Last 3 Encounters:   10/18/21 114/67   10/12/20 112/70   09/23/19 129/74    Wt Readings from Last 3 Encounters:   10/18/21 79.5  kg (175 lb 3.2 oz)   10/12/20 78.5 kg (173 lb)   09/23/19 75.8 kg (167 lb)                  Patient Active Problem List   Diagnosis     HYPERLIPIDEMIA LDL GOAL <130     Tobacco abuse     Low HDL (under 40)     Contracture of hand joint     Prediabetes     Advanced directives, counseling/discussion     Past Surgical History:   Procedure Laterality Date     NO HISTORY OF SURGERY         Social History     Tobacco Use     Smoking status: Current Every Day Smoker     Packs/day: 0.50     Years: 0.00     Pack years: 0.00     Types: Cigarettes     Smokeless tobacco: Never Used   Substance Use Topics     Alcohol use: Yes     Comment: rare     Family History   Problem Relation Age of Onset     Cerebrovascular Disease Mother         61yo     Diabetes Father         55-61yo     Unknown/Adopted Sister      Diabetes Brother          Current Outpatient Medications   Medication Sig Dispense Refill     ASPIRIN PO Take 81 mg by mouth daily       multivitamin w/minerals (MULTI-VITAMIN) tablet Take 1 tablet by mouth daily       simvastatin (ZOCOR) 20 MG tablet Take 1 tablet (20 mg) by mouth At Bedtime 90 tablet 3     No Known Allergies  Recent Labs   Lab Test 10/08/21  0941 10/12/20  1726 10/07/20  0841 09/16/19  0715 09/07/18  0800 09/07/18  0800 09/06/17  0907 09/06/17  0907 08/12/16  1015 08/06/16  0920 08/06/16  0920   0000   A1C  --   --  6.0*  --   --  6.1*  --  5.9  --   --   --    < >   LDL 93  --  86 84   < > 65   < > 81  --    < > 78  --    HDL 36*  --  39* 38*   < > 38*   < > 38*  --    < > 37*  --    TRIG 112  --  109 153*   < > 186*   < > 85  --    < > 132  --    ALT  --   --   --   --   --   --   --  27  --   --  23  --    CR  --   --   --  0.86  --  0.92   < > 0.94  --    < > 0.90  --    GFRESTIMATED  --   --   --  >90  --  85   < > 83  --    < > 87  --    GFRESTBLACK  --   --   --  >90  --  >90   < > >90  --    < > >90   GFR Calc    --    POTASSIUM  --   --   --  3.9  --  4.3   < > 4.4  --    < > 4.4   --    TSH  --  2.38  --   --   --   --   --   --  1.93  --   --   --     < > = values in this interval not displayed.        Reviewed and updated as needed this visit by clinical staff  Tobacco   Meds   Med Hx  Surg Hx  Fam Hx  Soc Hx        Reviewed and updated as needed this visit by Provider                Past Medical History:   Diagnosis Date     High cholesterol       Past Surgical History:   Procedure Laterality Date     NO HISTORY OF SURGERY         Review of Systems   Constitutional: Negative for chills and fever.   HENT: Negative for congestion, ear pain, hearing loss and sore throat.    Eyes: Negative for pain and visual disturbance.   Respiratory: Negative for cough and shortness of breath.    Cardiovascular: Negative for chest pain, palpitations and peripheral edema.   Gastrointestinal: Positive for abdominal pain and constipation. Negative for diarrhea, heartburn, hematochezia and nausea.   Genitourinary: Negative for discharge, dysuria, frequency, genital sores, hematuria, impotence and urgency.   Musculoskeletal: Positive for arthralgias. Negative for joint swelling and myalgias.   Skin: Negative for rash.   Neurological: Negative for dizziness, weakness, headaches and paresthesias.   Psychiatric/Behavioral: Negative for mood changes. The patient is not nervous/anxious.      CONSTITUTIONAL: NEGATIVE for fever, chills, change in weight  INTEGUMENTARY/SKIN: NEGATIVE for worrisome rashes, moles or lesions  EYES: NEGATIVE for vision changes or irritation  ENT: NEGATIVE for ear, mouth and throat problems  RESP: NEGATIVE for significant cough or SOB  CV: NEGATIVE for chest pain, palpitations or peripheral edema  GI: NEGATIVE for nausea, abdominal pain, heartburn, or change in bowel habits   male: negative for dysuria, hematuria, decreased urinary stream, erectile dysfunction, urethral discharge  MUSCULOSKELETAL: NEGATIVE for significant arthralgias or myalgia  NEURO: NEGATIVE for weakness, dizziness  "or paresthesias  PSYCHIATRIC: NEGATIVE for changes in mood or affect    OBJECTIVE:   /67   Pulse 65   Temp 98  F (36.7  C) (Tympanic)   Resp 20   Ht 1.721 m (5' 7.75\")   Wt 79.5 kg (175 lb 3.2 oz)   SpO2 98%   BMI 26.84 kg/m      Physical Exam  GENERAL: healthy, alert and no distress  EYES: Eyes grossly normal to inspection, PERRL and conjunctivae and sclerae normal  HENT: ear canals and TM's normal, nose and mouth without ulcers or lesions  NECK: no adenopathy, no asymmetry, masses, or scars and thyroid normal to palpation  RESP: lungs clear to auscultation - no rales, rhonchi or wheezes  CV: regular rate and rhythm, normal S1 S2, no S3 or S4, no murmur, click or rub, no peripheral edema and peripheral pulses strong  ABDOMEN: soft, nontender, no hepatosplenomegaly, no masses and bowel sounds normal  MS: no gross musculoskeletal defects noted, no edema  SKIN: no suspicious lesions or rashes  NEURO: Normal strength and tone, mentation intact and speech normal  PSYCH: mentation appears normal, affect normal/bright    Diagnostic Test Results:  Labs reviewed in Epic    ASSESSMENT/PLAN:       ICD-10-CM    1. Routine general medical examination at a health care facility  Z00.00    2. Elevated prostate specific antigen (PSA)  R97.20 Adult Urology Referral   3. Hyperlipidemia LDL goal <130  E78.5 simvastatin (ZOCOR) 20 MG tablet   4. Tobacco abuse  Z72.0 CT Chest Lung Cancer Scrn Low Dose wo   5. Colon cancer screening  Z12.11 Adult Gastro Ref - Procedure Only   work on lifestyle modification      Patient has been advised of split billing requirements and indicates understanding: Yes  COUNSELING:   Reviewed preventive health counseling, as reflected in patient instructions       Regular exercise       Healthy diet/nutrition    Estimated body mass index is 26.84 kg/m  as calculated from the following:    Height as of this encounter: 1.721 m (5' 7.75\").    Weight as of this encounter: 79.5 kg (175 lb 3.2 oz).   "   Weight management plan: Discussed healthy diet and exercise guidelines    He reports that he has been smoking cigarettes. He has been smoking about 0.50 packs per day for the past 0.00 years. He has never used smokeless tobacco.  Tobacco Cessation Action Plan:   Information offered: Patient not interested at this time      Counseling Resources:  ATP IV Guidelines  Pooled Cohorts Equation Calculator  FRAX Risk Assessment  ICSI Preventive Guidelines  Dietary Guidelines for Americans, 2010  USDA's MyPlate  ASA Prophylaxis  Lung CA Screening    Ed Aggarwal PA-C  [facility]

## 2021-10-28 ENCOUNTER — ANCILLARY PROCEDURE (OUTPATIENT)
Dept: CT IMAGING | Facility: CLINIC | Age: 61
End: 2021-10-28
Attending: PHYSICIAN ASSISTANT
Payer: COMMERCIAL

## 2021-10-28 DIAGNOSIS — Z72.0 TOBACCO ABUSE: ICD-10-CM

## 2021-10-28 PROCEDURE — 71271 CT THORAX LUNG CANCER SCR C-: CPT | Mod: TC | Performed by: RADIOLOGY

## 2021-11-30 ENCOUNTER — OFFICE VISIT (OUTPATIENT)
Dept: UROLOGY | Facility: CLINIC | Age: 61
End: 2021-11-30
Payer: COMMERCIAL

## 2021-11-30 VITALS — DIASTOLIC BLOOD PRESSURE: 81 MMHG | HEART RATE: 76 BPM | SYSTOLIC BLOOD PRESSURE: 133 MMHG | OXYGEN SATURATION: 99 %

## 2021-11-30 DIAGNOSIS — N40.0 BENIGN PROSTATIC HYPERPLASIA WITHOUT LOWER URINARY TRACT SYMPTOMS: Primary | ICD-10-CM

## 2021-11-30 DIAGNOSIS — R97.20 ELEVATED PROSTATE SPECIFIC ANTIGEN (PSA): ICD-10-CM

## 2021-11-30 PROCEDURE — 99203 OFFICE O/P NEW LOW 30 MIN: CPT | Performed by: UROLOGY

## 2021-11-30 RX ORDER — LEVOFLOXACIN 500 MG/1
500 TABLET, FILM COATED ORAL DAILY
Qty: 3 TABLET | Refills: 0 | Status: SHIPPED | OUTPATIENT
Start: 2021-11-30 | End: 2023-10-23

## 2021-11-30 NOTE — PATIENT INSTRUCTIONS
Patient Education   Resources to Help You Quit Smoking  If you have quit smoking or are thinking about quitting, congratulations! It can be hard to quit smoking, but the benefits are well worth it. To help you quit and stay smoke-free, there are many resources that can help.  Your health plan  If you have health insurance, call them for more details about their phone coaching programs.    [employer] [place] [facility] [employer]:  7-304-350-BLUE (1-244.538.5355)    [facility]:  9-068-078-QUIT ([phone])    [facility]:  3-329-683-BLUE ([phone])    [facility]:  [phone]    [employer]:  [phone]    [facility] members:  [phone]    [facility]:   [phone]    [facility]:  [phone]    [employer]:  [phone]  Other resources  American Cancer Society: 1-804.148.8038  The American Cancer Society can help you find local resources to quit smoking.  [employer]: 3-109-568-PLAN (1-990.319.3508)  Offers a telephone helpline, gum, patches and lozenges. These services are free for the uninsured and those without coverage. The online program is free to everyone at www.quitplan.com.  American Lung Association: 0-447-LUNG-USA (1-369.397.4951)  Provides a lung helpline as well as an online program, self-help book and group clinic support for quitting smoking. www.lung.org/stop-smoking  [employer] [facility] [place]:  8-518-415-QUIT (1-471.864.2797)  Offers a telephone hotline, online text chat and a website with tools, information and support for smokers who want to quit. www.smokefree.gov  Medication Therapy Management (MTM):  684-925-3718-672-7005 102.642.6599 (toll free)  mtm@[place].org  This is a clinic program to help you quit smoking. It offers one-on-one sessions with a pharmacist. Call or email to find out if your insurance covers MTM and to schedule an appointment at all locations.  For informational purposes only.  Not to replace the advice of your health care provider. Copyright   2013 [employer]. All rights reserved. Clinically reviewed by Oanh Oates MD, [facility] Lung Cancer Screening Program. [employer] [id number] - Rev 06/19.

## 2021-11-30 NOTE — PROGRESS NOTES
S: Humphrey Beckham is a pleasant  61 year old male who was requested to be seen by  Ben Cross for a consult with regard to patient's elevated PSA.  His recent PSA was found to be   PSA   Date Value Ref Range Status   10/07/2020 2.92 0 - 4 ug/L Final     Comment:     Assay Method:  Chemiluminescence using Siemens Vista analyzer     PSA Tumor Marker   Date Value Ref Range Status   10/08/2021 4.86 (H) 0.00 - 4.00 ug/L Final   .  His previous PSA was normal last year.  Patient complains of minimal LUTS.  He has no history of elevated PSA.  His AUA Symptom Score:  low.  Current Outpatient Medications   Medication Sig Dispense Refill     ASPIRIN PO Take 81 mg by mouth daily       levofloxacin (LEVAQUIN) 500 MG tablet Take 1 tablet (500 mg) by mouth daily To start day of biopsy 3 tablet 0     multivitamin w/minerals (MULTI-VITAMIN) tablet Take 1 tablet by mouth daily       simvastatin (ZOCOR) 20 MG tablet Take 1 tablet (20 mg) by mouth At Bedtime 90 tablet 3      No Known Allergies   Past Medical History:   Diagnosis Date     High cholesterol      Past Surgical History:   Procedure Laterality Date     NO HISTORY OF SURGERY        Family History   Problem Relation Age of Onset     Cerebrovascular Disease Mother         61yo     Diabetes Father         55-61yo     Unknown/Adopted Sister      Diabetes Brother      He does not have a family history of prostate cancer.  Social History     Socioeconomic History     Marital status:      Spouse name: Not on file     Number of children: 3     Years of education: Not on file     Highest education level: Not on file   Occupational History     Employer: [employer]   Tobacco Use     Smoking status: Current Every Day Smoker     Packs/day: 0.50     Years: 0.00     Pack years: 0.00     Types: Cigarettes     Smokeless tobacco: Never Used   Vaping Use     Vaping Use: Never used   Substance and Sexual Activity     Alcohol use: Yes     Comment: rare     Drug use: No      Sexual activity: Yes     Partners: Female   Other Topics Concern     Parent/sibling w/ CABG, MI or angioplasty before 65F 55M? No   Social History Narrative     Not on file     Social Determinants of Health     Financial Resource Strain: Not on file   Food Insecurity: Not on file   Transportation Needs: Not on file   Physical Activity: Not on file   Stress: Not on file   Social Connections: Not on file   Intimate Partner Violence: Not on file   Housing Stability: Not on file        REVIEW OF SYSTEMS  =================  C: NEGATIVE for fever, chills, change in weight  I: NEGATIVE for worrisome rashes, moles or lesions  E/M: NEGATIVE for ear, mouth and throat problems  R: NEGATIVE for significant cough or SHORTNESS OF BREATH  CV:  NEGATIVE for chest pain, palpitations or peripheral edema  GI: NEGATIVE for nausea, abdominal pain, heartburn, or change in bowel habits  NEURO: NEGATIVE  PSYCH: NEGATIVE    Physical Exam:  /81 (BP Location: Right arm, Patient Position: Chair, Cuff Size: Adult Large)   Pulse 76   SpO2 99%    Patient is pleasant, in no acute distress, good general condition.  Lung: no evidence of respiratory distress    Abdomen: Soft, nondistended, non tender. No masses. No rebound or guarding.   Exam: penis no discharge. Testis no masses.  No scrotal skin lesion.  Prostate 50 gm smooth.  Skin: Warm and dry.  No redness.  Musculaskeletal: moving all extremities.  No weakness.  Neuro non focal  Psych normal mood and affect    Assessment/Plan:   (N40.0) Benign prostatic hyperplasia without lower urinary tract symptoms  (primary encounter diagnosis)  Comment:    Plan: prn    (R97.20) Elevated prostate specific antigen (PSA)  Comment:    Plan: levofloxacin (LEVAQUIN) 500 MG tablet        Discussed psa elevation/prostate cancer        Schedule for trus and biopsy         Risks of bleeding/infection discussed.

## 2021-12-17 ENCOUNTER — TELEPHONE (OUTPATIENT)
Dept: GASTROENTEROLOGY | Facility: CLINIC | Age: 61
End: 2021-12-17
Payer: COMMERCIAL

## 2022-07-29 ENCOUNTER — MYC MEDICAL ADVICE (OUTPATIENT)
Dept: FAMILY MEDICINE | Facility: CLINIC | Age: 62
End: 2022-07-29

## 2022-08-11 ENCOUNTER — TRANSFERRED RECORDS (OUTPATIENT)
Dept: MULTI SPECIALTY CLINIC | Facility: CLINIC | Age: 62
End: 2022-08-11

## 2022-08-16 ENCOUNTER — DOCUMENTATION ONLY (OUTPATIENT)
Dept: LAB | Facility: CLINIC | Age: 62
End: 2022-08-16

## 2022-08-16 DIAGNOSIS — R73.03 PREDIABETES: ICD-10-CM

## 2022-08-16 DIAGNOSIS — E78.5 HYPERLIPIDEMIA LDL GOAL <130: Primary | ICD-10-CM

## 2022-08-16 DIAGNOSIS — R97.20 INCREASED PROSTATE SPECIFIC ANTIGEN (PSA) VELOCITY: ICD-10-CM

## 2022-08-16 DIAGNOSIS — Z12.5 SCREENING FOR PROSTATE CANCER: ICD-10-CM

## 2022-08-16 NOTE — PROGRESS NOTES
Humphrey Beckham has an upcoming lab appointment:    Future Appointments   Date Time Provider Department Center   8/17/2022  3:15 PM [facility]   10/20/2022  3:20 PM Ed Aggarwal PA-C [facility]     Patient is scheduled for labs.    There is no order available. Please review and place either future orders or HMPO (Review of Health Maintenance Protocol Orders), as appropriate.    Health Maintenance Due   Topic     HIV SCREENING      Anselmo Mendez

## 2022-08-17 ENCOUNTER — LAB (OUTPATIENT)
Dept: LAB | Facility: CLINIC | Age: 62
End: 2022-08-17
Payer: COMMERCIAL

## 2022-08-17 DIAGNOSIS — R97.20 ELEVATED PROSTATE SPECIFIC ANTIGEN (PSA): Primary | ICD-10-CM

## 2022-08-17 DIAGNOSIS — Z11.4 SCREENING FOR HIV (HUMAN IMMUNODEFICIENCY VIRUS): ICD-10-CM

## 2022-08-17 PROCEDURE — 36415 COLL VENOUS BLD VENIPUNCTURE: CPT

## 2022-10-14 NOTE — PROGRESS NOTES
SUBJECTIVE:   CC: Jose is an 62 year old who presents for preventative health visit.       Patient has been advised of split billing requirements and indicates understanding: Yes  Healthy Habits:     Getting at least 3 servings of Calcium per day:  Yes    Bi-annual eye exam:  Yes    Dental care twice a year:  Yes    Sleep apnea or symptoms of sleep apnea:  None    Diet:  Regular (no restrictions)    Frequency of exercise:  1 day/week    Duration of exercise:  15-30 minutes    Taking medications regularly:  Yes    Medication side effects:  None    PHQ-2 Total Score: 0    Additional concerns today:  No        Today's PHQ-2 Score:   PHQ-2 ( 1999 [employer]) 10/20/2022   Q1: Little interest or pleasure in doing things 0   Q2: Feeling down, depressed or hopeless 0   PHQ-2 Score 0   PHQ-2 Total Score (12-17 Years)- Positive if 3 or more points; Administer PHQ-A if positive -   Q1: Little interest or pleasure in doing things Not at all   Q2: Feeling down, depressed or hopeless Not at all   PHQ-2 Score 0       Abuse: Current or Past(Physical, Sexual or Emotional)- No  Do you feel safe in your environment? Yes    Have you ever done Advance Care Planning? (For example, a Health Directive, POLST, or a discussion with a medical provider or your loved ones about your wishes): No, advance care planning information given to patient to review.  Patient declined advance care planning discussion at this time.    Social History     Tobacco Use     Smoking status: Every Day     Packs/day: 0.50     Years: 0.00     Pack years: 0.00     Types: Cigarettes     Smokeless tobacco: Never   Substance Use Topics     Alcohol use: Yes     Comment: rare         Alcohol Use 10/20/2022   Prescreen: >3 drinks/day or >7 drinks/week? No   Prescreen: >3 drinks/day or >7 drinks/week? -       Last PSA:   PSA   Date Value Ref Range Status   10/07/2020 2.92 0 - 4 ug/L Final     Comment:     Assay Method:  Chemiluminescence using Siemens Vista analyzer     PSA  Tumor Marker   Date Value Ref Range Status   10/17/2022 4.80 (H) 0.00 - 4.00 ug/L Final       Reviewed orders with patient. Reviewed health maintenance and updated orders accordingly - Yes  Lab work is in process  Labs reviewed in EPIC  BP Readings from Last 3 Encounters:   10/20/22 129/81   11/30/21 133/81   10/18/21 114/67    Wt Readings from Last 3 Encounters:   10/20/22 78.9 kg (174 lb)   10/18/21 79.5 kg (175 lb 3.2 oz)   10/12/20 78.5 kg (173 lb)                  Patient Active Problem List   Diagnosis     HYPERLIPIDEMIA LDL GOAL <130     Tobacco abuse     Low HDL (under 40)     Contracture of hand joint     Prediabetes     Advanced directives, counseling/discussion     Past Surgical History:   Procedure Laterality Date     NO HISTORY OF SURGERY         Social History     Tobacco Use     Smoking status: Every Day     Packs/day: 0.50     Years: 0.00     Pack years: 0.00     Types: Cigarettes     Smokeless tobacco: Never   Substance Use Topics     Alcohol use: Yes     Comment: rare     Family History   Problem Relation Age of Onset     Cerebrovascular Disease Mother         59yo     Diabetes Father         55-59yo     Unknown/Adopted Sister      Diabetes Brother          Current Outpatient Medications   Medication Sig Dispense Refill     ASPIRIN PO Take 81 mg by mouth daily       multivitamin w/minerals (THERA-VIT-M) tablet Take 1 tablet by mouth daily       simvastatin (ZOCOR) 20 MG tablet Take 1 tablet (20 mg) by mouth At Bedtime 90 tablet 3     levofloxacin (LEVAQUIN) 500 MG tablet Take 1 tablet (500 mg) by mouth daily To start day of biopsy (Patient not taking: Reported on 10/20/2022) 3 tablet 0     No Known Allergies  Recent Labs   Lab Test 10/17/22  0707 10/08/21  0941 10/12/20  1726 10/07/20  0841 09/16/19  0715 09/07/18  0800 09/06/17  0907 08/12/16  1015 08/06/16  0920 08/06/16  0920   A1C 6.3*  --   --  6.0*  --  6.1* 5.9  --    < >  --    LDL 95 93  --  86 84 65 81  --   --  78   HDL 37* 36*  --  39*  38* 38* 38*  --   --  37*   TRIG 130 112  --  109 153* 186* 85  --   --  132   ALT  --   --   --   --   --   --  27  --   --  23   CR 0.96  --   --   --  0.86 0.92 0.94  --   --  0.90   GFRESTIMATED 90  --   --   --  >90 85 83  --   --  87   GFRESTBLACK  --   --   --   --  >90 >90 >90  --   --  >90  African American GFR Calc     POTASSIUM 4.9  --   --   --  3.9 4.3 4.4  --   --  4.4   TSH  --   --  2.38  --   --   --   --  1.93  --   --     < > = values in this interval not displayed.        Reviewed and updated as needed this visit by clinical staff   Tobacco  Allergies  Meds              Reviewed and updated as needed this visit by Provider                 Past Medical History:   Diagnosis Date     High cholesterol       Past Surgical History:   Procedure Laterality Date     NO HISTORY OF SURGERY         Review of Systems   Constitutional: Negative for chills and fever.   HENT: Negative for congestion, ear pain, hearing loss and sore throat.    Eyes: Negative for pain and visual disturbance.   Respiratory: Negative for cough and shortness of breath.    Cardiovascular: Negative for chest pain, palpitations and peripheral edema.   Gastrointestinal: Negative for abdominal pain, constipation, diarrhea, heartburn, hematochezia and nausea.   Genitourinary: Negative for dysuria, frequency, genital sores, hematuria and urgency.   Musculoskeletal: Negative for arthralgias, joint swelling and myalgias.   Skin: Negative for rash.   Neurological: Negative for dizziness, weakness, headaches and paresthesias.   Psychiatric/Behavioral: Negative for mood changes. The patient is not nervous/anxious.      CONSTITUTIONAL: NEGATIVE for fever, chills, change in weight  INTEGUMENTARY/SKIN: NEGATIVE for worrisome rashes, moles or lesions  EYES: NEGATIVE for vision changes or irritation  ENT: NEGATIVE for ear, mouth and throat problems  RESP: NEGATIVE for significant cough or SOB  CV: NEGATIVE for chest pain, palpitations or  peripheral edema  GI: NEGATIVE for nausea, abdominal pain, heartburn, or change in bowel habits   male: negative for dysuria, hematuria, decreased urinary stream, erectile dysfunction, urethral discharge  MUSCULOSKELETAL: NEGATIVE for significant arthralgias or myalgia  NEURO: NEGATIVE for weakness, dizziness or paresthesias  PSYCHIATRIC: NEGATIVE for changes in mood or affect    OBJECTIVE:   /81 (BP Location: Left arm, Patient Position: Chair, Cuff Size: Adult Regular)   Pulse 83   Temp 98.6  F (37  C) (Tympanic)   Resp 16   Wt 78.9 kg (174 lb)   SpO2 100%   BMI 26.65 kg/m      Physical Exam  GENERAL: healthy, alert and no distress  EYES: Eyes grossly normal to inspection, PERRL and conjunctivae and sclerae normal  HENT: ear canals and TM's normal, nose and mouth without ulcers or lesions  NECK: no adenopathy, no asymmetry, masses, or scars and thyroid normal to palpation  RESP: lungs clear to auscultation - no rales, rhonchi or wheezes  CV: regular rate and rhythm, normal S1 S2, no S3 or S4, no murmur, click or rub, no peripheral edema and peripheral pulses strong  ABDOMEN: soft, nontender, no hepatosplenomegaly, no masses and bowel sounds normal  MS: no gross musculoskeletal defects noted, no edema  SKIN: no suspicious lesions or rashes  NEURO: Normal strength and tone, mentation intact and speech normal  PSYCH: mentation appears normal, affect normal/bright    Diagnostic Test Results:  Labs reviewed in Epic    ASSESSMENT/PLAN:       ICD-10-CM    1. Screen for colon cancer  Z12.11       2. Colon cancer screening  Z12.11       3. Routine general medical examination at a health care facility  Z00.00           Patient has been advised of split billing requirements and indicates understanding: Yes    COUNSELING:   Reviewed preventive health counseling, as reflected in patient instructions       Regular exercise       Healthy diet/nutrition    Estimated body mass index is 26.65 kg/m  as calculated from  "the following:    Height as of 10/18/21: 1.721 m (5' 7.75\").    Weight as of this encounter: 78.9 kg (174 lb).         He reports that he has been smoking cigarettes. He has been smoking an average of 0.50 packs per day. He has never used smokeless tobacco.  Nicotine/Tobacco Cessation Plan:   Information offered: Patient not interested at this time      Counseling Resources:  ATP IV Guidelines  Pooled Cohorts Equation Calculator  FRAX Risk Assessment  ICSI Preventive Guidelines  Dietary Guidelines for Americans, 2010  USDA's MyPlate  ASA Prophylaxis  Lung CA Screening    [facility]  "

## 2022-10-17 ENCOUNTER — LAB (OUTPATIENT)
Dept: LAB | Facility: CLINIC | Age: 62
End: 2022-10-17
Payer: COMMERCIAL

## 2022-10-17 DIAGNOSIS — Z11.4 SCREENING FOR HIV (HUMAN IMMUNODEFICIENCY VIRUS): ICD-10-CM

## 2022-10-17 DIAGNOSIS — E78.5 HYPERLIPIDEMIA LDL GOAL <130: ICD-10-CM

## 2022-10-17 DIAGNOSIS — R97.20 INCREASED PROSTATE SPECIFIC ANTIGEN (PSA) VELOCITY: ICD-10-CM

## 2022-10-17 DIAGNOSIS — R73.03 PREDIABETES: ICD-10-CM

## 2022-10-17 LAB
ANION GAP SERPL CALCULATED.3IONS-SCNC: <1 MMOL/L (ref 3–14)
BUN SERPL-MCNC: 19 MG/DL (ref 7–30)
CALCIUM SERPL-MCNC: 9.3 MG/DL (ref 8.5–10.1)
CHLORIDE BLD-SCNC: 110 MMOL/L (ref 94–109)
CHOLEST SERPL-MCNC: 158 MG/DL
CO2 SERPL-SCNC: 31 MMOL/L (ref 20–32)
CREAT SERPL-MCNC: 0.96 MG/DL (ref 0.66–1.25)
FASTING STATUS PATIENT QL REPORTED: YES
GFR SERPL CREATININE-BSD FRML MDRD: 90 ML/MIN/1.73M2
GLUCOSE BLD-MCNC: 141 MG/DL (ref 70–99)
HBA1C MFR BLD: 6.3 % (ref 0–5.6)
HDLC SERPL-MCNC: 37 MG/DL
HIV 1+2 AB+HIV1 P24 AG SERPL QL IA: NONREACTIVE
LDLC SERPL CALC-MCNC: 95 MG/DL
NONHDLC SERPL-MCNC: 121 MG/DL
POTASSIUM BLD-SCNC: 4.9 MMOL/L (ref 3.4–5.3)
PSA SERPL-MCNC: 4.8 UG/L (ref 0–4)
SODIUM SERPL-SCNC: 141 MMOL/L (ref 133–144)
TRIGL SERPL-MCNC: 130 MG/DL

## 2022-10-17 PROCEDURE — 83036 HEMOGLOBIN GLYCOSYLATED A1C: CPT

## 2022-10-17 PROCEDURE — 80061 LIPID PANEL: CPT

## 2022-10-17 PROCEDURE — 36415 COLL VENOUS BLD VENIPUNCTURE: CPT

## 2022-10-17 PROCEDURE — 80048 BASIC METABOLIC PNL TOTAL CA: CPT

## 2022-10-17 PROCEDURE — 87389 HIV-1 AG W/HIV-1&-2 AB AG IA: CPT

## 2022-10-17 PROCEDURE — 84153 ASSAY OF PSA TOTAL: CPT

## 2022-10-20 ENCOUNTER — OFFICE VISIT (OUTPATIENT)
Dept: FAMILY MEDICINE | Facility: CLINIC | Age: 62
End: 2022-10-20
Payer: COMMERCIAL

## 2022-10-20 VITALS
DIASTOLIC BLOOD PRESSURE: 81 MMHG | RESPIRATION RATE: 16 BRPM | TEMPERATURE: 98.6 F | BODY MASS INDEX: 26.65 KG/M2 | HEART RATE: 83 BPM | WEIGHT: 174 LBS | OXYGEN SATURATION: 100 % | SYSTOLIC BLOOD PRESSURE: 129 MMHG

## 2022-10-20 DIAGNOSIS — Z72.0 TOBACCO ABUSE: ICD-10-CM

## 2022-10-20 DIAGNOSIS — E78.5 HYPERLIPIDEMIA LDL GOAL <130: ICD-10-CM

## 2022-10-20 DIAGNOSIS — Z00.00 ROUTINE GENERAL MEDICAL EXAMINATION AT A HEALTH CARE FACILITY: Primary | ICD-10-CM

## 2022-10-20 PROCEDURE — 90682 RIV4 VACC RECOMBINANT DNA IM: CPT | Performed by: PHYSICIAN ASSISTANT

## 2022-10-20 PROCEDURE — 90677 PCV20 VACCINE IM: CPT | Performed by: PHYSICIAN ASSISTANT

## 2022-10-20 PROCEDURE — 90471 IMMUNIZATION ADMIN: CPT | Performed by: PHYSICIAN ASSISTANT

## 2022-10-20 PROCEDURE — 99396 PREV VISIT EST AGE 40-64: CPT | Mod: 25 | Performed by: PHYSICIAN ASSISTANT

## 2022-10-20 PROCEDURE — 90472 IMMUNIZATION ADMIN EACH ADD: CPT | Performed by: PHYSICIAN ASSISTANT

## 2022-10-20 RX ORDER — VARENICLINE TARTRATE 0.5 MG/1
TABLET, FILM COATED ORAL
Qty: 11 TABLET | Refills: 0 | Status: SHIPPED | OUTPATIENT
Start: 2022-10-20 | End: 2023-10-23

## 2022-10-20 RX ORDER — SIMVASTATIN 20 MG
20 TABLET ORAL AT BEDTIME
Qty: 90 TABLET | Refills: 3 | Status: SHIPPED | OUTPATIENT
Start: 2022-10-20 | End: 2023-10-23

## 2022-10-20 RX ORDER — VARENICLINE TARTRATE 1 MG/1
1 TABLET, FILM COATED ORAL 2 TIMES DAILY
Qty: 60 TABLET | Refills: 1 | Status: SHIPPED | OUTPATIENT
Start: 2022-10-20 | End: 2023-10-23

## 2022-10-20 ASSESSMENT — ENCOUNTER SYMPTOMS
WEAKNESS: 0
PALPITATIONS: 0
PARESTHESIAS: 0
HEARTBURN: 0
ARTHRALGIAS: 0
DYSURIA: 0
SORE THROAT: 0
HEMATURIA: 0
DIZZINESS: 0
SHORTNESS OF BREATH: 0
MYALGIAS: 0
COUGH: 0
JOINT SWELLING: 0
FEVER: 0
NAUSEA: 0
CONSTIPATION: 0
FREQUENCY: 0
DIARRHEA: 0
NERVOUS/ANXIOUS: 0
ABDOMINAL PAIN: 0
CHILLS: 0
EYE PAIN: 0
HEADACHES: 0
HEMATOCHEZIA: 0

## 2023-03-30 ENCOUNTER — LAB (OUTPATIENT)
Dept: LAB | Facility: CLINIC | Age: 63
End: 2023-03-30
Payer: COMMERCIAL

## 2023-03-30 DIAGNOSIS — R97.20 ELEVATED PROSTATE SPECIFIC ANTIGEN (PSA): ICD-10-CM

## 2023-03-30 DIAGNOSIS — R68.82 DECREASED SEX DRIVE: ICD-10-CM

## 2023-03-30 DIAGNOSIS — R73.03 PREDIABETES: ICD-10-CM

## 2023-03-30 LAB
ALBUMIN UR-MCNC: NEGATIVE MG/DL
ANION GAP SERPL CALCULATED.3IONS-SCNC: 6 MMOL/L (ref 3–14)
APPEARANCE UR: CLEAR
BILIRUB UR QL STRIP: NEGATIVE
BUN SERPL-MCNC: 17 MG/DL (ref 7–30)
CALCIUM SERPL-MCNC: 9.3 MG/DL (ref 8.5–10.1)
CHLORIDE BLD-SCNC: 102 MMOL/L (ref 94–109)
CO2 SERPL-SCNC: 30 MMOL/L (ref 20–32)
COLOR UR AUTO: YELLOW
CREAT SERPL-MCNC: 0.94 MG/DL (ref 0.66–1.25)
GFR SERPL CREATININE-BSD FRML MDRD: >90 ML/MIN/1.73M2
GLUCOSE BLD-MCNC: 103 MG/DL (ref 70–99)
GLUCOSE UR STRIP-MCNC: NEGATIVE MG/DL
HBA1C MFR BLD: 7 % (ref 0–5.6)
HGB UR QL STRIP: NEGATIVE
KETONES UR STRIP-MCNC: NEGATIVE MG/DL
LEUKOCYTE ESTERASE UR QL STRIP: NEGATIVE
NITRATE UR QL: NEGATIVE
PH UR STRIP: 7 [PH] (ref 5–7)
POTASSIUM BLD-SCNC: 4.2 MMOL/L (ref 3.4–5.3)
PSA SERPL-MCNC: 6.78 UG/L (ref 0–4)
SODIUM SERPL-SCNC: 138 MMOL/L (ref 133–144)
SP GR UR STRIP: 1.02 (ref 1–1.03)
UROBILINOGEN UR STRIP-ACNC: 0.2 E.U./DL

## 2023-03-30 PROCEDURE — 36415 COLL VENOUS BLD VENIPUNCTURE: CPT

## 2023-03-30 PROCEDURE — 84403 ASSAY OF TOTAL TESTOSTERONE: CPT

## 2023-03-30 PROCEDURE — 83036 HEMOGLOBIN GLYCOSYLATED A1C: CPT

## 2023-03-30 PROCEDURE — 81003 URINALYSIS AUTO W/O SCOPE: CPT

## 2023-03-30 PROCEDURE — 84153 ASSAY OF PSA TOTAL: CPT

## 2023-03-30 PROCEDURE — 80048 BASIC METABOLIC PNL TOTAL CA: CPT

## 2023-04-02 LAB — TESTOST SERPL-MCNC: 410 NG/DL (ref 240–950)

## 2023-08-04 ENCOUNTER — TRANSFERRED RECORDS (OUTPATIENT)
Dept: MULTI SPECIALTY CLINIC | Facility: CLINIC | Age: 63
End: 2023-08-04

## 2023-08-04 LAB — RETINOPATHY: NORMAL

## 2023-10-19 ENCOUNTER — LAB (OUTPATIENT)
Dept: LAB | Facility: CLINIC | Age: 63
End: 2023-10-19
Payer: COMMERCIAL

## 2023-10-19 DIAGNOSIS — R73.03 PREDIABETES: ICD-10-CM

## 2023-10-19 DIAGNOSIS — R97.20 ELEVATED PROSTATE SPECIFIC ANTIGEN (PSA): ICD-10-CM

## 2023-10-19 DIAGNOSIS — E78.5 HYPERLIPIDEMIA LDL GOAL <130: ICD-10-CM

## 2023-10-19 LAB
ANION GAP SERPL CALCULATED.3IONS-SCNC: 9 MMOL/L (ref 7–15)
BUN SERPL-MCNC: 19.6 MG/DL (ref 8–23)
CALCIUM SERPL-MCNC: 9.7 MG/DL (ref 8.8–10.2)
CHLORIDE SERPL-SCNC: 104 MMOL/L (ref 98–107)
CHOLEST SERPL-MCNC: 156 MG/DL
CREAT SERPL-MCNC: 1.07 MG/DL (ref 0.67–1.17)
DEPRECATED HCO3 PLAS-SCNC: 28 MMOL/L (ref 22–29)
EGFRCR SERPLBLD CKD-EPI 2021: 78 ML/MIN/1.73M2
GLUCOSE SERPL-MCNC: 155 MG/DL (ref 70–99)
HDLC SERPL-MCNC: 34 MG/DL
LDLC SERPL CALC-MCNC: 95 MG/DL
NONHDLC SERPL-MCNC: 122 MG/DL
POTASSIUM SERPL-SCNC: 4.4 MMOL/L (ref 3.4–5.3)
PSA SERPL DL<=0.01 NG/ML-MCNC: 4.56 NG/ML (ref 0–4.5)
SODIUM SERPL-SCNC: 141 MMOL/L (ref 135–145)
TRIGL SERPL-MCNC: 134 MG/DL

## 2023-10-19 PROCEDURE — 80048 BASIC METABOLIC PNL TOTAL CA: CPT

## 2023-10-19 PROCEDURE — 36415 COLL VENOUS BLD VENIPUNCTURE: CPT

## 2023-10-19 PROCEDURE — 80061 LIPID PANEL: CPT

## 2023-10-19 PROCEDURE — 84153 ASSAY OF PSA TOTAL: CPT

## 2023-10-23 ENCOUNTER — OFFICE VISIT (OUTPATIENT)
Dept: FAMILY MEDICINE | Facility: CLINIC | Age: 63
End: 2023-10-23
Payer: COMMERCIAL

## 2023-10-23 VITALS
OXYGEN SATURATION: 99 % | WEIGHT: 177.2 LBS | DIASTOLIC BLOOD PRESSURE: 74 MMHG | BODY MASS INDEX: 27.81 KG/M2 | RESPIRATION RATE: 20 BRPM | SYSTOLIC BLOOD PRESSURE: 120 MMHG | HEIGHT: 67 IN | HEART RATE: 88 BPM | TEMPERATURE: 97.5 F

## 2023-10-23 DIAGNOSIS — E78.5 HYPERLIPIDEMIA LDL GOAL <130: ICD-10-CM

## 2023-10-23 DIAGNOSIS — Z12.11 SCREEN FOR COLON CANCER: ICD-10-CM

## 2023-10-23 DIAGNOSIS — Z00.01 ENCOUNTER FOR ROUTINE ADULT MEDICAL EXAM WITH ABNORMAL FINDINGS: Primary | ICD-10-CM

## 2023-10-23 DIAGNOSIS — Z72.0 TOBACCO ABUSE: ICD-10-CM

## 2023-10-23 DIAGNOSIS — E11.69 TYPE 2 DIABETES MELLITUS WITH OTHER SPECIFIED COMPLICATION, WITHOUT LONG-TERM CURRENT USE OF INSULIN (H): ICD-10-CM

## 2023-10-23 LAB — HBA1C MFR BLD: 7.1 % (ref 0–5.6)

## 2023-10-23 PROCEDURE — 99396 PREV VISIT EST AGE 40-64: CPT | Mod: 25 | Performed by: PHYSICIAN ASSISTANT

## 2023-10-23 PROCEDURE — 83036 HEMOGLOBIN GLYCOSYLATED A1C: CPT | Performed by: PHYSICIAN ASSISTANT

## 2023-10-23 PROCEDURE — 36415 COLL VENOUS BLD VENIPUNCTURE: CPT | Performed by: PHYSICIAN ASSISTANT

## 2023-10-23 PROCEDURE — 90471 IMMUNIZATION ADMIN: CPT | Performed by: PHYSICIAN ASSISTANT

## 2023-10-23 PROCEDURE — 99214 OFFICE O/P EST MOD 30 MIN: CPT | Mod: 25 | Performed by: PHYSICIAN ASSISTANT

## 2023-10-23 PROCEDURE — 90682 RIV4 VACC RECOMBINANT DNA IM: CPT | Performed by: PHYSICIAN ASSISTANT

## 2023-10-23 RX ORDER — VARENICLINE TARTRATE 1 MG/1
1 TABLET, FILM COATED ORAL 2 TIMES DAILY
COMMUNITY
Start: 2023-10-23 | End: 2024-02-12

## 2023-10-23 RX ORDER — VARENICLINE TARTRATE 0.5 MG/1
TABLET, FILM COATED ORAL
COMMUNITY
Start: 2023-10-23 | End: 2024-02-12

## 2023-10-23 RX ORDER — METFORMIN HCL 500 MG
500 TABLET, EXTENDED RELEASE 24 HR ORAL
Qty: 90 TABLET | Refills: 1 | Status: SHIPPED | OUTPATIENT
Start: 2023-10-23 | End: 2024-02-12

## 2023-10-23 RX ORDER — SIMVASTATIN 20 MG
20 TABLET ORAL AT BEDTIME
Qty: 90 TABLET | Refills: 0 | Status: SHIPPED | OUTPATIENT
Start: 2023-10-23 | End: 2024-01-19

## 2023-10-23 ASSESSMENT — ENCOUNTER SYMPTOMS
FREQUENCY: 0
FEVER: 0
PALPITATIONS: 0
HEMATOCHEZIA: 0
ABDOMINAL PAIN: 0
EYE PAIN: 0
DYSURIA: 0
DIZZINESS: 0
DIARRHEA: 0
MYALGIAS: 0
HEARTBURN: 0
NAUSEA: 0
NERVOUS/ANXIOUS: 0
COUGH: 0
PARESTHESIAS: 0
CONSTIPATION: 0
CHILLS: 0
HEADACHES: 0
SHORTNESS OF BREATH: 0
SORE THROAT: 0
JOINT SWELLING: 0
HEMATURIA: 0
ARTHRALGIAS: 0
WEAKNESS: 0

## 2023-10-23 ASSESSMENT — PAIN SCALES - GENERAL: PAINLEVEL: NO PAIN (0)

## 2023-10-23 NOTE — PROGRESS NOTES
SUBJECTIVE:   CC: Jose is an 63 year old who presents for preventative health visit.       10/23/2023     4:05 PM   Additional Questions   Roomed by Giulia Trevino CMA   Accompanied by none         10/23/2023     4:05 PM   Patient Reported Additional Medications   Patient reports taking the following new medications none       Healthy Habits:     Getting at least 3 servings of Calcium per day:  Yes    Bi-annual eye exam:  Yes    Dental care twice a year:  Yes    Sleep apnea or symptoms of sleep apnea:  None    Diet:  Regular (no restrictions)    Frequency of exercise:  1 day/week    Duration of exercise:  30-45 minutes    Taking medications regularly:  Yes    Medication side effects:  None    Additional concerns today:  No    Reviewed recent lab work.   Elevated glucose. No polydipsia/uria.  No chest pain/sob/palpitations/dizziness/ha's  No neuropathy symptoms. Vision stable.   Cholesterol numbers at goal.    Today's PHQ-2 Score:       10/23/2023     4:07 PM   PHQ-2 ( 1999 Pfizer)   Q1: Little interest or pleasure in doing things 0   Q2: Feeling down, depressed or hopeless 0   PHQ-2 Score 0   Q1: Little interest or pleasure in doing things Not at all   Q2: Feeling down, depressed or hopeless Not at all   PHQ-2 Score 0               Social History     Tobacco Use    Smoking status: Every Day     Packs/day: 0.50     Years: 0.00     Additional pack years: 0.00     Total pack years: 0.00     Types: Cigarettes    Smokeless tobacco: Never   Substance Use Topics    Alcohol use: Yes     Comment: rare             10/23/2023     4:07 PM   Alcohol Use   Prescreen: >3 drinks/day or >7 drinks/week? No       Last PSA:   PSA   Date Value Ref Range Status   10/07/2020 2.92 0 - 4 ug/L Final     Comment:     Assay Method:  Chemiluminescence using Siemens Vista analyzer     PSA Tumor Marker   Date Value Ref Range Status   10/19/2023 4.56 (H) 0.00 - 4.50 ng/mL Final   03/30/2023 6.78 (H) 0.00 - 4.00 ug/L Final       Reviewed orders  with patient. Reviewed health maintenance and updated orders accordingly - Yes  Lab work is in process  Labs reviewed in EPIC  BP Readings from Last 3 Encounters:   10/23/23 120/74   10/20/22 129/81   11/30/21 133/81    Wt Readings from Last 3 Encounters:   10/23/23 80.4 kg (177 lb 3.2 oz)   10/20/22 78.9 kg (174 lb)   10/18/21 79.5 kg (175 lb 3.2 oz)                  Patient Active Problem List   Diagnosis    HYPERLIPIDEMIA LDL GOAL <130    Tobacco abuse    Low HDL (under 40)    Contracture of hand joint    Prediabetes    Advanced directives, counseling/discussion     Past Surgical History:   Procedure Laterality Date    NO HISTORY OF SURGERY         Social History     Tobacco Use    Smoking status: Every Day     Packs/day: 0.50     Years: 0.00     Additional pack years: 0.00     Total pack years: 0.00     Types: Cigarettes    Smokeless tobacco: Never   Substance Use Topics    Alcohol use: Yes     Comment: rare     Family History   Problem Relation Age of Onset    Cerebrovascular Disease Mother         59yo    Diabetes Father         55-59yo    Unknown/Adopted Sister     Diabetes Brother          Current Outpatient Medications   Medication Sig Dispense Refill    ASPIRIN PO Take 81 mg by mouth daily      metFORMIN (GLUCOPHAGE XR) 500 MG 24 hr tablet Take 1 tablet (500 mg) by mouth daily (with dinner) 90 tablet 1    multivitamin w/minerals (THERA-VIT-M) tablet Take 1 tablet by mouth daily      simvastatin (ZOCOR) 20 MG tablet TAKE 1 TABLET (20 MG) BY MOUTH AT BEDTIME 90 tablet 0    varenicline (CHANTIX) 0.5 MG tablet Take 1 tab daily for 3 days, then increase to 1 tab twice a day 4 days.      varenicline (CHANTIX) 1 MG tablet Take 1 tablet (1 mg) by mouth 2 times daily      VITAMIN D PO        No Known Allergies  Recent Labs   Lab Test 10/23/23  1652 10/19/23  0708 03/30/23  1556 10/17/22  0707 10/17/22  0707 10/08/21  0941 10/12/20  1726 10/07/20  0841 09/16/19  0715 09/07/18  0800 09/06/17  0907 08/12/16  1015  08/06/16 0920 08/06/16 0920   A1C 7.1*  --  7.0*  --  6.3*  --   --    < >  --  6.1* 5.9  --    < >  --    LDL  --  95  --   --  95 93  --    < > 84 65 81  --   --  78   HDL  --  34*  --   --  37* 36*  --    < > 38* 38* 38*  --   --  37*   TRIG  --  134  --   --  130 112  --    < > 153* 186* 85  --   --  132   ALT  --   --   --   --   --   --   --   --   --   --  27  --   --  23   CR  --  1.07 0.94   < > 0.96  --   --   --  0.86 0.92 0.94  --   --  0.90   GFRESTIMATED  --  78 >90   < > 90  --   --   --  >90 85 83  --   --  87   GFRESTBLACK  --   --   --   --   --   --   --   --  >90 >90 >90  --   --  >90  African American GFR Calc     POTASSIUM  --  4.4 4.2   < > 4.9  --   --   --  3.9 4.3 4.4  --   --  4.4   TSH  --   --   --   --   --   --  2.38  --   --   --   --  1.93  --   --     < > = values in this interval not displayed.        Reviewed and updated as needed this visit by clinical staff   Tobacco  Allergies  Meds              Reviewed and updated as needed this visit by Provider                 Past Medical History:   Diagnosis Date    High cholesterol       Past Surgical History:   Procedure Laterality Date    NO HISTORY OF SURGERY         Review of Systems   Constitutional:  Negative for chills and fever.   HENT:  Negative for congestion, ear pain, hearing loss and sore throat.    Eyes:  Negative for pain and visual disturbance.   Respiratory:  Negative for cough and shortness of breath.    Cardiovascular:  Negative for chest pain, palpitations and peripheral edema.   Gastrointestinal:  Negative for abdominal pain, constipation, diarrhea, heartburn, hematochezia and nausea.   Genitourinary:  Negative for dysuria, frequency, genital sores, hematuria, impotence, penile discharge and urgency.   Musculoskeletal:  Negative for arthralgias, joint swelling and myalgias.   Skin:  Negative for rash.   Neurological:  Negative for dizziness, weakness, headaches and paresthesias.   Psychiatric/Behavioral:   "Negative for mood changes. The patient is not nervous/anxious.      CONSTITUTIONAL: NEGATIVE for fever, chills, change in weight  INTEGUMENTARY/SKIN: NEGATIVE for worrisome rashes, moles or lesions  EYES: NEGATIVE for vision changes or irritation  ENT: NEGATIVE for ear, mouth and throat problems  RESP: NEGATIVE for significant cough or SOB  CV: NEGATIVE for chest pain, palpitations or peripheral edema  GI: NEGATIVE for nausea, abdominal pain, heartburn, or change in bowel habits   male: negative for dysuria, hematuria, decreased urinary stream, erectile dysfunction, urethral discharge  MUSCULOSKELETAL: NEGATIVE for significant arthralgias or myalgia  NEURO: NEGATIVE for weakness, dizziness or paresthesias  PSYCHIATRIC: NEGATIVE for changes in mood or affect    OBJECTIVE:   /74   Pulse 88   Temp 97.5  F (36.4  C) (Temporal)   Resp 20   Ht 1.708 m (5' 7.25\")   Wt 80.4 kg (177 lb 3.2 oz)   SpO2 99%   BMI 27.55 kg/m      Physical Exam  GENERAL: healthy, alert and no distress  EYES: Eyes grossly normal to inspection, PERRL and conjunctivae and sclerae normal  HENT: ear canals and TM's normal, nose and mouth without ulcers or lesions  NECK: no adenopathy, no asymmetry, masses, or scars and thyroid normal to palpation  RESP: lungs clear to auscultation - no rales, rhonchi or wheezes  CV: regular rate and rhythm, normal S1 S2, no S3 or S4, no murmur, click or rub, no peripheral edema and peripheral pulses strong  ABDOMEN: soft, nontender, no hepatosplenomegaly, no masses and bowel sounds normal  MS: no gross musculoskeletal defects noted, no edema  SKIN: no suspicious lesions or rashes  NEURO: Normal strength and tone, mentation intact and speech normal  PSYCH: mentation appears normal, affect normal/bright    Diagnostic Test Results:  Labs reviewed in Epic    ASSESSMENT/PLAN:       ICD-10-CM    1. Encounter for routine adult medical exam with abnormal findings  Z00.01       2. Screen for colon cancer  Z12.11 " Colonoscopy Screening  Referral      3. Type 2 diabetes mellitus with other specified complication, without long-term current use of insulin (H)  E11.69 Hemoglobin A1c     Hemoglobin A1c     metFORMIN (GLUCOPHAGE XR) 500 MG 24 hr tablet      4. Hyperlipidemia LDL goal <130  E78.5       5. Tobacco abuse  Z72.0 varenicline (CHANTIX) 0.5 MG tablet     varenicline (CHANTIX) 1 MG tablet      Start metformin.  Exercise  Lower sugar /starch diet.  Recheck in 4 mos   Patient has been advised of split billing requirements and indicates understanding: Yes      COUNSELING:   Reviewed preventive health counseling, as reflected in patient instructions       Regular exercise       Healthy diet/nutrition        He reports that he has been smoking cigarettes. He has been smoking an average of 0.50 packs per day. He has never used smokeless tobacco.  Nicotine/Tobacco Cessation Plan:   Information offered: Patient not interested at this time            Ed Aggarwal PA-C  [facility]

## 2023-11-10 ENCOUNTER — MYC MEDICAL ADVICE (OUTPATIENT)
Dept: FAMILY MEDICINE | Facility: CLINIC | Age: 63
End: 2023-11-10
Payer: COMMERCIAL

## 2024-01-23 ENCOUNTER — MYC MEDICAL ADVICE (OUTPATIENT)
Dept: FAMILY MEDICINE | Facility: CLINIC | Age: 64
End: 2024-01-23
Payer: COMMERCIAL

## 2024-01-23 DIAGNOSIS — E78.5 HYPERLIPIDEMIA LDL GOAL <130: ICD-10-CM

## 2024-01-23 DIAGNOSIS — E11.69 TYPE 2 DIABETES MELLITUS WITH OTHER SPECIFIED COMPLICATION, WITHOUT LONG-TERM CURRENT USE OF INSULIN (H): Primary | ICD-10-CM

## 2024-01-23 DIAGNOSIS — R97.20 ELEVATED PROSTATE SPECIFIC ANTIGEN (PSA): ICD-10-CM

## 2024-01-23 NOTE — TELEPHONE ENCOUNTER
RN replied to patient via [employer]. See message for details.     Dannie Beck RN, BSN, PHN  [facility]: [place]

## 2024-02-11 ENCOUNTER — HEALTH MAINTENANCE LETTER (OUTPATIENT)
Age: 64
End: 2024-02-11

## 2024-02-12 ENCOUNTER — OFFICE VISIT (OUTPATIENT)
Dept: FAMILY MEDICINE | Facility: CLINIC | Age: 64
End: 2024-02-12
Payer: COMMERCIAL

## 2024-02-12 VITALS
HEART RATE: 87 BPM | OXYGEN SATURATION: 98 % | SYSTOLIC BLOOD PRESSURE: 118 MMHG | DIASTOLIC BLOOD PRESSURE: 80 MMHG | BODY MASS INDEX: 26.8 KG/M2 | TEMPERATURE: 97.3 F | HEIGHT: 68 IN | WEIGHT: 176.8 LBS | RESPIRATION RATE: 20 BRPM

## 2024-02-12 DIAGNOSIS — M25.561 ARTHRALGIA OF BOTH KNEES: ICD-10-CM

## 2024-02-12 DIAGNOSIS — E78.5 HYPERLIPIDEMIA LDL GOAL <130: ICD-10-CM

## 2024-02-12 DIAGNOSIS — M25.562 ARTHRALGIA OF BOTH KNEES: ICD-10-CM

## 2024-02-12 DIAGNOSIS — E11.69 TYPE 2 DIABETES MELLITUS WITH OTHER SPECIFIED COMPLICATION, WITHOUT LONG-TERM CURRENT USE OF INSULIN (H): Primary | ICD-10-CM

## 2024-02-12 DIAGNOSIS — R97.20 ELEVATED PROSTATE SPECIFIC ANTIGEN (PSA): ICD-10-CM

## 2024-02-12 PROBLEM — E11.9 DIABETES MELLITUS, TYPE 2 (H): Status: ACTIVE | Noted: 2024-02-12

## 2024-02-12 LAB — HBA1C MFR BLD: 7.2 % (ref 0–5.6)

## 2024-02-12 PROCEDURE — 82570 ASSAY OF URINE CREATININE: CPT | Performed by: PHYSICIAN ASSISTANT

## 2024-02-12 PROCEDURE — 99214 OFFICE O/P EST MOD 30 MIN: CPT | Performed by: PHYSICIAN ASSISTANT

## 2024-02-12 PROCEDURE — 82043 UR ALBUMIN QUANTITATIVE: CPT | Performed by: PHYSICIAN ASSISTANT

## 2024-02-12 PROCEDURE — 84153 ASSAY OF PSA TOTAL: CPT | Performed by: PHYSICIAN ASSISTANT

## 2024-02-12 PROCEDURE — 36415 COLL VENOUS BLD VENIPUNCTURE: CPT | Performed by: PHYSICIAN ASSISTANT

## 2024-02-12 PROCEDURE — 83036 HEMOGLOBIN GLYCOSYLATED A1C: CPT | Performed by: PHYSICIAN ASSISTANT

## 2024-02-12 PROCEDURE — 86618 LYME DISEASE ANTIBODY: CPT | Performed by: PHYSICIAN ASSISTANT

## 2024-02-12 RX ORDER — SIMVASTATIN 20 MG
20 TABLET ORAL AT BEDTIME
Qty: 90 TABLET | Refills: 3 | Status: SHIPPED | OUTPATIENT
Start: 2024-02-12

## 2024-02-12 RX ORDER — METFORMIN HCL 500 MG
500 TABLET, EXTENDED RELEASE 24 HR ORAL 2 TIMES DAILY WITH MEALS
Qty: 180 TABLET | Refills: 0 | Status: SHIPPED | OUTPATIENT
Start: 2024-02-12 | End: 2024-05-14

## 2024-02-12 ASSESSMENT — PAIN SCALES - GENERAL: PAINLEVEL: NO PAIN (0)

## 2024-02-12 NOTE — PROGRESS NOTES
"    Mona Garcia is a 63 year old, presenting for the following health issues:  Recheck Medication and Health Maintenance        2/12/2024     4:04 PM   Additional Questions   Roomed by Giulia Trevino CMA   Accompanied by None         2/12/2024     4:04 PM   Patient Reported Additional Medications   Patient reports taking the following new medications none     Via the Health Maintenance questionnaire, the patient has reported the following services have been completed -Eye Exam, this information has been sent to the abstraction team.  History of Present Illness       Reason for visit:  Med check    He eats 0-1 servings of fruits and vegetables daily.He consumes 2 sweetened beverage(s) daily.He exercises with enough effort to increase his heart rate 10 to 19 minutes per day.  He exercises with enough effort to increase his heart rate 4 days per week. He is missing 1 dose(s) of medications per week.  He is not taking prescribed medications regularly due to remembering to take.     [place] tick bite last fall. Some knee stiffness/pain. No swelling . No locking. No injury . No rashes.   History of elevated psa. No irritative/obstructive voiding symptoms.   No chest pain/sob/palpitations/dizziness/ha's  No vision changes. No neuropathy symptoms.   Review of Systems  Constitutional, neuro, ENT, endocrine, pulmonary, cardiac, gastrointestinal, genitourinary, musculoskeletal, integument and psychiatric systems are negative, except as otherwise noted.      Objective    /80   Pulse 87   Temp 97.3  F (36.3  C) (Temporal)   Resp 20   Ht 1.715 m (5' 7.5\")   Wt 80.2 kg (176 lb 12.8 oz)   SpO2 98%   BMI 27.28 kg/m    Body mass index is 27.28 kg/m .  Physical Exam   Eye exam - right eye normal lid, conjunctiva, cornea, pupil and fundus, left eye normal lid, conjunctiva, cornea, pupil and fundus.  Thyroid not palpable, not enlarged, no nodules detected.  CHEST:chest clear to IPPA, no tachypnea, retractions or " cyanosis, and S1, S2 normal, no murmur, no gallop, rate regular.  Foot exam - both sides normal; no swelling, tenderness or skin or vascular lesions. Color and temperature is normal. Sensation is intact. Peripheral pulses are palpable. Toenails are normal.  Knees reveal full range of motion, no tenderness, masses, effusion or ligamentous instability.    Jose was seen today for recheck medication and health maintenance.    Diagnoses and all orders for this visit:    Type 2 diabetes mellitus with other specified complication, without long-term current use of insulin (H)  -     Albumin Random Urine Quantitative with Creat Ratio; Future  -     Hemoglobin A1c; Future  -     Hemoglobin A1c  -     Albumin Random Urine Quantitative with Creat Ratio  -     Cancel: Hemoglobin A1c  -     metFORMIN (GLUCOPHAGE XR) 500 MG 24 hr tablet; Take 1 tablet (500 mg) by mouth 2 times daily (with meals)    Arthralgia of both knees  -     Lyme Disease Total Abs Bld with Reflex to Confirm CLIA; Future  -     Lyme Disease Total Abs Bld with Reflex to Confirm CLIA    Elevated prostate specific antigen (PSA)  -     PSA, tumor marker; Future  -     PSA, tumor marker  -     Cancel: PSA, tumor marker    Suspect knee pain is due to OA. Recommended over the counter agents like glucosamine/turmeric.   Bumped up metformin to 500 mg bid.   Advised supportive and symptomatic treatment.  Follow up with Provider - if condition persists or worsens.   Recheck in 3-4 mos   Signed Electronically by: Ed Aggarwal PA-C

## 2024-02-13 LAB
B BURGDOR IGG+IGM SER QL: 0.09
CREAT UR-MCNC: 77.3 MG/DL
MICROALBUMIN UR-MCNC: <12 MG/L
MICROALBUMIN/CREAT UR: NORMAL MG/G{CREAT}
PSA SERPL DL<=0.01 NG/ML-MCNC: 7.05 NG/ML (ref 0–4.5)

## 2024-05-14 DIAGNOSIS — E11.69 TYPE 2 DIABETES MELLITUS WITH OTHER SPECIFIED COMPLICATION, WITHOUT LONG-TERM CURRENT USE OF INSULIN (H): ICD-10-CM

## 2024-05-14 RX ORDER — METFORMIN HCL 500 MG
500 TABLET, EXTENDED RELEASE 24 HR ORAL 2 TIMES DAILY WITH MEALS
Qty: 180 TABLET | Refills: 0 | Status: SHIPPED | OUTPATIENT
Start: 2024-05-14 | End: 2024-07-29

## 2024-06-17 PROBLEM — Z71.89 ADVANCED DIRECTIVES, COUNSELING/DISCUSSION: Status: RESOLVED | Noted: 2017-09-12 | Resolved: 2024-06-17

## 2024-06-30 ENCOUNTER — HEALTH MAINTENANCE LETTER (OUTPATIENT)
Age: 64
End: 2024-06-30

## 2024-07-01 ENCOUNTER — MYC MEDICAL ADVICE (OUTPATIENT)
Dept: FAMILY MEDICINE | Facility: CLINIC | Age: 64
End: 2024-07-01
Payer: COMMERCIAL

## 2024-07-26 ENCOUNTER — TRANSFERRED RECORDS (OUTPATIENT)
Dept: MULTI SPECIALTY CLINIC | Facility: CLINIC | Age: 64
End: 2024-07-26

## 2024-07-26 LAB — RETINOPATHY: NORMAL

## 2024-07-29 ENCOUNTER — OFFICE VISIT (OUTPATIENT)
Dept: FAMILY MEDICINE | Facility: CLINIC | Age: 64
End: 2024-07-29
Payer: COMMERCIAL

## 2024-07-29 VITALS
SYSTOLIC BLOOD PRESSURE: 102 MMHG | TEMPERATURE: 98.4 F | DIASTOLIC BLOOD PRESSURE: 68 MMHG | OXYGEN SATURATION: 99 % | RESPIRATION RATE: 20 BRPM | BODY MASS INDEX: 25.43 KG/M2 | HEIGHT: 68 IN | WEIGHT: 167.8 LBS | HEART RATE: 71 BPM

## 2024-07-29 DIAGNOSIS — R97.20 ELEVATED PROSTATE SPECIFIC ANTIGEN (PSA): ICD-10-CM

## 2024-07-29 DIAGNOSIS — E11.69 TYPE 2 DIABETES MELLITUS WITH OTHER SPECIFIED COMPLICATION, WITHOUT LONG-TERM CURRENT USE OF INSULIN (H): Primary | ICD-10-CM

## 2024-07-29 LAB — HBA1C MFR BLD: 6.4 % (ref 0–5.6)

## 2024-07-29 PROCEDURE — 99214 OFFICE O/P EST MOD 30 MIN: CPT | Performed by: PHYSICIAN ASSISTANT

## 2024-07-29 PROCEDURE — 99207 PR FOOT EXAM NO CHARGE: CPT | Performed by: PHYSICIAN ASSISTANT

## 2024-07-29 PROCEDURE — 83036 HEMOGLOBIN GLYCOSYLATED A1C: CPT | Performed by: PHYSICIAN ASSISTANT

## 2024-07-29 PROCEDURE — 36415 COLL VENOUS BLD VENIPUNCTURE: CPT | Performed by: PHYSICIAN ASSISTANT

## 2024-07-29 PROCEDURE — 84153 ASSAY OF PSA TOTAL: CPT | Performed by: PHYSICIAN ASSISTANT

## 2024-07-29 PROCEDURE — 80048 BASIC METABOLIC PNL TOTAL CA: CPT | Performed by: PHYSICIAN ASSISTANT

## 2024-07-29 PROCEDURE — G2211 COMPLEX E/M VISIT ADD ON: HCPCS | Performed by: PHYSICIAN ASSISTANT

## 2024-07-29 RX ORDER — METFORMIN HCL 500 MG
500 TABLET, EXTENDED RELEASE 24 HR ORAL 2 TIMES DAILY WITH MEALS
Qty: 180 TABLET | Refills: 1 | Status: SHIPPED | OUTPATIENT
Start: 2024-07-29

## 2024-07-29 NOTE — PROGRESS NOTES
"    Mona Garcia is a 64 year old, presenting for the following health issues:  Recheck Medication        7/29/2024     4:37 PM   Additional Questions   Roomed by Giulia Trevino CMA   Accompanied by None         7/29/2024     4:37 PM   Patient Reported Additional Medications   Patient reports taking the following new medications none     History of Present Illness       Diabetes:   He presents for follow up of diabetes.    He is not checking blood glucose.         He has no concerns regarding his diabetes at this time.   He is not experiencing numbness or burning in feet, excessive thirst, blurry vision, weight changes or redness, sores or blisters on feet. The patient has not had a diabetic eye exam in the last 12 months.          He eats 0-1 servings of fruits and vegetables daily.He consumes 2 sweetened beverage(s) daily.He exercises with enough effort to increase his heart rate 10 to 19 minutes per day.  He exercises with enough effort to increase his heart rate 4 days per week.   He is taking medications regularly.       History of elevated psa.  Vision stable  No chest pain/sob/palpitations/dizziness/ha's  No neuropathy symptoms.   No irritative/obstructive voiding symptoms.     Review of Systems  Constitutional, HEENT, cardiovascular, pulmonary, GI, , musculoskeletal, neuro, skin, endocrine and psych systems are negative, except as otherwise noted.      Objective    /68   Pulse 71   Temp 98.4  F (36.9  C) (Oral)   Resp 20   Ht 1.715 m (5' 7.5\")   Wt 76.1 kg (167 lb 12.8 oz)   SpO2 99%   BMI 25.89 kg/m    Body mass index is 25.89 kg/m .  Physical Exam   Diabetic foot exam: normal DP and PT pulses, no trophic changes or ulcerative lesions, normal sensory exam, and normal monofilament exam   Eye exam - right eye normal lid, conjunctiva, cornea, pupil and fundus, left eye normal lid, conjunctiva, cornea, pupil and fundus.  Thyroid not palpable, not enlarged, no nodules detected.  CHEST:chest " clear to IPPA, no tachypnea, retractions or cyanosis, and S1, S2 normal, no murmur, no gallop, rate regular.  Jose was seen today for recheck medication.    Diagnoses and all orders for this visit:    Type 2 diabetes mellitus with other specified complication, without long-term current use of insulin (H)  -     HEMOGLOBIN A1C; Future  -     Adult Eye  Referral; Future  -     FOOT EXAM  -     Basic metabolic panel  (Ca, Cl, CO2, Creat, Gluc, K, Na, BUN); Future  -     HEMOGLOBIN A1C  -     Basic metabolic panel  (Ca, Cl, CO2, Creat, Gluc, K, Na, BUN)  -     metFORMIN (GLUCOPHAGE XR) 500 MG 24 hr tablet; Take 1 tablet (500 mg) by mouth 2 times daily (with meals)    Elevated prostate specific antigen (PSA)  -     PSA, tumor marker; Future  -     PSA, tumor marker      work on lifestyle modification  Lower sugar/starch diet. More exercise  Recheck in 6 mos   Signed Electronically by: Ed Aggarwal PA-C

## 2024-07-30 LAB
ANION GAP SERPL CALCULATED.3IONS-SCNC: 9 MMOL/L (ref 7–15)
BUN SERPL-MCNC: 18.3 MG/DL (ref 8–23)
CALCIUM SERPL-MCNC: 9.6 MG/DL (ref 8.8–10.4)
CHLORIDE SERPL-SCNC: 103 MMOL/L (ref 98–107)
CREAT SERPL-MCNC: 1.15 MG/DL (ref 0.67–1.17)
EGFRCR SERPLBLD CKD-EPI 2021: 72 ML/MIN/1.73M2
GLUCOSE SERPL-MCNC: 82 MG/DL (ref 70–99)
HCO3 SERPL-SCNC: 27 MMOL/L (ref 22–29)
POTASSIUM SERPL-SCNC: 5 MMOL/L (ref 3.4–5.3)
PSA SERPL DL<=0.01 NG/ML-MCNC: 6.94 NG/ML (ref 0–4.5)
SODIUM SERPL-SCNC: 139 MMOL/L (ref 135–145)

## 2024-10-28 ENCOUNTER — OFFICE VISIT (OUTPATIENT)
Dept: FAMILY MEDICINE | Facility: CLINIC | Age: 64
End: 2024-10-28
Payer: COMMERCIAL

## 2024-10-28 VITALS
DIASTOLIC BLOOD PRESSURE: 60 MMHG | WEIGHT: 165 LBS | OXYGEN SATURATION: 98 % | SYSTOLIC BLOOD PRESSURE: 112 MMHG | RESPIRATION RATE: 20 BRPM | BODY MASS INDEX: 25.01 KG/M2 | HEART RATE: 77 BPM | HEIGHT: 68 IN | TEMPERATURE: 98 F

## 2024-10-28 DIAGNOSIS — Z72.0 TOBACCO ABUSE: ICD-10-CM

## 2024-10-28 DIAGNOSIS — R97.20 ELEVATED PROSTATE SPECIFIC ANTIGEN (PSA): ICD-10-CM

## 2024-10-28 DIAGNOSIS — Z00.00 ROUTINE GENERAL MEDICAL EXAMINATION AT A HEALTH CARE FACILITY: Primary | ICD-10-CM

## 2024-10-28 PROCEDURE — 36415 COLL VENOUS BLD VENIPUNCTURE: CPT | Performed by: PHYSICIAN ASSISTANT

## 2024-10-28 PROCEDURE — 84153 ASSAY OF PSA TOTAL: CPT | Performed by: PHYSICIAN ASSISTANT

## 2024-10-28 PROCEDURE — 90715 TDAP VACCINE 7 YRS/> IM: CPT | Performed by: PHYSICIAN ASSISTANT

## 2024-10-28 PROCEDURE — 90471 IMMUNIZATION ADMIN: CPT | Performed by: PHYSICIAN ASSISTANT

## 2024-10-28 PROCEDURE — 99396 PREV VISIT EST AGE 40-64: CPT | Mod: 25 | Performed by: PHYSICIAN ASSISTANT

## 2024-10-28 PROCEDURE — 90673 RIV3 VACCINE NO PRESERV IM: CPT | Performed by: PHYSICIAN ASSISTANT

## 2024-10-28 PROCEDURE — 90472 IMMUNIZATION ADMIN EACH ADD: CPT | Performed by: PHYSICIAN ASSISTANT

## 2024-10-28 SDOH — HEALTH STABILITY: PHYSICAL HEALTH: ON AVERAGE, HOW MANY DAYS PER WEEK DO YOU ENGAGE IN MODERATE TO STRENUOUS EXERCISE (LIKE A BRISK WALK)?: 3 DAYS

## 2024-10-28 SDOH — HEALTH STABILITY: PHYSICAL HEALTH: ON AVERAGE, HOW MANY MINUTES DO YOU ENGAGE IN EXERCISE AT THIS LEVEL?: 20 MIN

## 2024-10-28 ASSESSMENT — SOCIAL DETERMINANTS OF HEALTH (SDOH): HOW OFTEN DO YOU GET TOGETHER WITH FRIENDS OR RELATIVES?: PATIENT DECLINED

## 2024-10-28 NOTE — PATIENT INSTRUCTIONS
Patient Education   Preventive Care Advice   This is general advice given by our system to help you stay healthy. However, your care team may have specific advice just for you. Please talk to your care team about your preventive care needs.  Nutrition  Eat 5 or more servings of fruits and vegetables each day.  Try wheat bread, brown rice and whole grain pasta (instead of white bread, rice, and pasta).  Get enough calcium and vitamin D. Check the label on foods and aim for 100% of the RDA (recommended daily allowance).  Lifestyle  Exercise at least 150 minutes each week  (30 minutes a day, 5 days a week).  Do muscle strengthening activities 2 days a week. These help control your weight and prevent disease.  No smoking.  Wear sunscreen to prevent skin cancer.  Have a dental exam and cleaning every 6 months.  Yearly exams  See your health care team every year to talk about:  Any changes in your health.  Any medicines your care team has prescribed.  Preventive care, family planning, and ways to prevent chronic diseases.  Shots (vaccines)   HPV shots (up to age 26), if you've never had them before.  Hepatitis B shots (up to age 59), if you've never had them before.  COVID-19 shot: Get this shot when it's due.  Flu shot: Get a flu shot every year.  Tetanus shot: Get a tetanus shot every 10 years.  Pneumococcal, hepatitis A, and RSV shots: Ask your care team if you need these based on your risk.  Shingles shot (for age 50 and up)  General health tests  Diabetes screening:  Starting at age 35, Get screened for diabetes at least every 3 years.  If you are younger than age 35, ask your care team if you should be screened for diabetes.  Cholesterol test: At age 39, start having a cholesterol test every 5 years, or more often if advised.  Bone density scan (DEXA): At age 50, ask your care team if you should have this scan for osteoporosis (brittle bones).  Hepatitis C: Get tested at least once in your life.  STIs (sexually  transmitted infections)  Before age 24: Ask your care team if you should be screened for STIs.  After age 24: Get screened for STIs if you're at risk. You are at risk for STIs (including HIV) if:  You are sexually active with more than one person.  You don't use condoms every time.  You or a partner was diagnosed with a sexually transmitted infection.  If you are at risk for HIV, ask about PrEP medicine to prevent HIV.  Get tested for HIV at least once in your life, whether you are at risk for HIV or not.  Cancer screening tests  Cervical cancer screening: If you have a cervix, begin getting regular cervical cancer screening tests starting at age 21.  Breast cancer scan (mammogram): If you've ever had breasts, begin having regular mammograms starting at age 40. This is a scan to check for breast cancer.  Colon cancer screening: It is important to start screening for colon cancer at age 45.  Have a colonoscopy test every 10 years (or more often if you're at risk) Or, ask your provider about stool tests like a FIT test every year or Cologuard test every 3 years.  To learn more about your testing options, visit:   .  For help making a decision, visit:   https://bit.ly/vi80464.  Prostate cancer screening test: If you have a prostate, ask your care team if a prostate cancer screening test (PSA) at age 55 is right for you.  Lung cancer screening: If you are a current or former smoker ages 50 to 80, ask your care team if ongoing lung cancer screenings are right for you.  For informational purposes only. Not to replace the advice of your health care provider. Copyright   2023 [employer]. All rights reserved. Clinically reviewed by the [facility] Transitions Program. [employer] 938172 - REV 01/24.

## 2024-10-28 NOTE — PROGRESS NOTES
Preventive Care Visit  [facility] JAMES Aggarwal PA-C, Family Medicine  Oct 28, 2024        Mona Garcia is a 64 year old, presenting for the following:  Physical and Health Maintenance (Patients is not fasting)        10/28/2024     4:27 PM   Additional Questions   Roomed by Giulia Trevino CMA   Accompanied by none         10/28/2024     4:27 PM   Patient Reported Additional Medications   Patient reports taking the following new medications none        Via the Health Maintenance questionnaire, the patient has reported the following services have been completed -[facility], this information has been sent to the abstraction team.    HPI    Health Care Directive  Patient does not have a Health Care Directive: Discussed advance care planning with patient; however, patient declined at this time.      10/28/2024   General Health   How would you rate your overall physical health? Good   Feel stress (tense, anxious, or unable to sleep) Only a little      (!) STRESS CONCERN      10/28/2024   Nutrition   Three or more servings of calcium each day? Yes   Diet: I don't know   How many servings of fruit and vegetables per day? (!) 0-1   How many sweetened beverages each day? (!) 2            10/28/2024   Exercise   Days per week of moderate/strenous exercise 3 days   Average minutes spent exercising at this level 20 min            10/28/2024   Social Factors   Frequency of gathering with friends or relatives Patient declined   Worry food won't last until get money to buy more No   Food not last or not have enough money for food? No   Do you have housing? (Housing is defined as stable permanent housing and does not include staying ouside in a car, in a tent, in an abandoned building, in an overnight shelter, or couch-surfing.) Yes   Are you worried about losing your housing? No   Lack of transportation? No   Unable to get utilities (heat,electricity)? No             10/28/2024   Fall Risk   Fallen 2 or more times in the past year? No    Trouble with walking or balance? No        Patient-reported          10/28/2024   Dental   Dentist two times every year? Yes            10/28/2024   TB Screening   Were you born outside of the [place]? No              Today's PHQ-2 Score:       2024     3:59 PM   PHQ-2 (  [employer])   Q1: Little interest or pleasure in doing things 0    Q2: Feeling down, depressed or hopeless 0    PHQ-2 Score 0   Q1: Little interest or pleasure in doing things Not at all   Q2: Feeling down, depressed or hopeless Not at all   PHQ-2 Score 0       Patient-reported         10/28/2024   Substance Use   Alcohol more than 3/day or more than 7/wk No   Do you use any other substances recreationally? No        Social History     Tobacco Use    Smoking status: Every Day     Current packs/day: 0.50     Types: Cigarettes    Smokeless tobacco: Never   Vaping Use    Vaping status: Never Used   Substance Use Topics    Alcohol use: Yes     Comment: rare    Drug use: No           10/28/2024   STI Screening   New sexual partner(s) since last STI/HIV test? No      Last PSA:   PSA   Date Value Ref Range Status   10/07/2020 2.92 0 - 4 ug/L Final     Comment:     Assay Method:  Chemiluminescence using Siemens Vista analyzer     PSA Tumor Marker   Date Value Ref Range Status   2024 6.94 (H) 0.00 - 4.50 ng/mL Final   2023 6.78 (H) 0.00 - 4.00 ug/L Final     ASCVD Risk   The 10-year ASCVD risk score (Cassius BLAIR, et al., 2019) is: 21%    Values used to calculate the score:      Age: 64 years      Sex: Male      Is Non- : No      Diabetic: Yes      Tobacco smoker: Yes      Systolic Blood Pressure: 102 mmHg      Is BP treated: No      HDL Cholesterol: 34 mg/dL      Total Cholesterol: 156 mg/dL    Fracture Risk Assessment Tool  Link to Frax Calculator  Use the information below to complete the Frax calculator  : 1960  Sex: male  Weight  (kg): 74.8 kg (actual weight)  Height (cm): 172.1 cm  Previous Fragility Fracture:  No  History of parent with fractured hip:  No  Current Smoking:  Yes  Patient has been on glucocorticoids for more than 3 months (5mg/day or more): Yes  Rheumatoid Arthritis on Problem List:  No  Secondary Osteoporosis on Problem List:  No  Consumes 3 or more units of alcohol per day: No  Femoral Neck BMD (g/cm2)           Reviewed and updated as needed this visit by Provider                    Past Medical History:   Diagnosis Date    Diabetes mellitus, type 2 (H) 2/12/2024    High cholesterol      Past Surgical History:   Procedure Laterality Date    NO HISTORY OF SURGERY       BP Readings from Last 3 Encounters:   10/28/24 112/60   07/29/24 102/68   02/12/24 118/80    Wt Readings from Last 3 Encounters:   10/28/24 74.8 kg (165 lb)   07/29/24 76.1 kg (167 lb 12.8 oz)   02/12/24 80.2 kg (176 lb 12.8 oz)                  Patient Active Problem List   Diagnosis    HYPERLIPIDEMIA LDL GOAL <130    Tobacco abuse    Low HDL (under 40)    Contracture of hand joint    Prediabetes    Diabetes mellitus, type 2 (H)     Past Surgical History:   Procedure Laterality Date    NO HISTORY OF SURGERY         Social History     Tobacco Use    Smoking status: Every Day     Current packs/day: 0.50     Types: Cigarettes    Smokeless tobacco: Never   Substance Use Topics    Alcohol use: Yes     Comment: rare     Family History   Problem Relation Age of Onset    Cerebrovascular Disease Mother         61yo    Diabetes Father         55-61yo    Unknown/Adopted Sister     Diabetes Brother          Current Outpatient Medications   Medication Sig Dispense Refill    ASPIRIN PO Take 81 mg by mouth daily      metFORMIN (GLUCOPHAGE XR) 500 MG 24 hr tablet Take 1 tablet (500 mg) by mouth 2 times daily (with meals) 180 tablet 1    multivitamin w/minerals (THERA-VIT-M) tablet Take 1 tablet by mouth daily      simvastatin (ZOCOR) 20 MG tablet Take 1 tablet (20 mg) by  "mouth at bedtime 90 tablet 3    VITAMIN D PO        No Known Allergies  Recent Labs   Lab Test 07/29/24  1706 02/12/24  1636 10/23/23  1652 10/19/23  0708 03/30/23  1556 10/17/22  0707 10/08/21  0941 10/12/20  1726 10/07/20  0841 09/16/19  0715 09/07/18  0800 09/06/17  0907 09/06/17  0907 08/12/16  1015   A1C 6.4* 7.2* 7.1*  --    < > 6.3*  --   --    < >  --  6.1*   < > 5.9  --    LDL  --   --   --  95  --  95 93  --    < > 84 65   < > 81  --    HDL  --   --   --  34*  --  37* 36*  --    < > 38* 38*   < > 38*  --    TRIG  --   --   --  134  --  130 112  --    < > 153* 186*   < > 85  --    ALT  --   --   --   --   --   --   --   --   --   --   --   --  27  --    CR 1.15  --   --  1.07   < > 0.96  --   --   --  0.86 0.92   < > 0.94  --    GFRESTIMATED 72  --   --  78   < > 90  --   --   --  >90 85   < > 83  --    GFRESTBLACK  --   --   --   --   --   --   --   --   --  >90 >90   < > >90  --    POTASSIUM 5.0  --   --  4.4   < > 4.9  --   --   --  3.9 4.3   < > 4.4  --    TSH  --   --   --   --   --   --   --  2.38  --   --   --   --   --  1.93    < > = values in this interval not displayed.          Review of Systems  Constitutional, HEENT, cardiovascular, pulmonary, GI, , musculoskeletal, neuro, skin, endocrine and psych systems are negative, except as otherwise noted.     Objective    Exam  There were no vitals taken for this visit.   Estimated body mass index is 25.89 kg/m  as calculated from the following:    Height as of 7/29/24: 1.715 m (5' 7.5\").    Weight as of 7/29/24: 76.1 kg (167 lb 12.8 oz).    Physical Exam  GENERAL: alert and no distress  EYES: Eyes grossly normal to inspection, PERRL and conjunctivae and sclerae normal  HENT: ear canals and TM's normal, nose and mouth without ulcers or lesions  NECK: no adenopathy, no asymmetry, masses, or scars  RESP: lungs clear to auscultation - no rales, rhonchi or wheezes  CV: regular rate and rhythm, normal S1 S2, no S3 or S4, no murmur, click or rub, no " peripheral edema  ABDOMEN: soft, nontender, no hepatosplenomegaly, no masses and bowel sounds normal  MS: no gross musculoskeletal defects noted, no edema  SKIN: no suspicious lesions or rashes  NEURO: Normal strength and tone, mentation intact and speech normal  PSYCH: mentation appears normal, affect normal/bright    Jose was seen today for physical and health maintenance.    Diagnoses and all orders for this visit:    Routine general medical examination at a health care facility    Tobacco abuse  -     CT Chest Lung Cancer Screen Low Dose Without; Future    Elevated prostate specific antigen (PSA)  -     PSA, tumor marker; Future    Other orders  -     PRIMARY CARE FOLLOW-UP SCHEDULING; Future  -     TDAP 10-64Y (ADACEL,BOOSTRIX)  -     INFLUENZA VACCINE TRIVALENT(FLUBLOK)  -     REVIEW OF HEALTH MAINTENANCE PROTOCOL ORDERS      work on lifestyle modification  Lower fat, higher fiber diet and consistent exercise.  Continue to work on a lower sugar/starch diet and more exercise.      Signed Electronically by: Ed Aggarwal PA-C

## 2024-10-29 LAB — PSA SERPL DL<=0.01 NG/ML-MCNC: 8.2 NG/ML (ref 0–4.5)

## 2024-11-17 ENCOUNTER — HEALTH MAINTENANCE LETTER (OUTPATIENT)
Age: 64
End: 2024-11-17

## 2024-11-25 ENCOUNTER — ANCILLARY PROCEDURE (OUTPATIENT)
Dept: CT IMAGING | Facility: CLINIC | Age: 64
End: 2024-11-25
Attending: PHYSICIAN ASSISTANT
Payer: COMMERCIAL

## 2024-11-25 DIAGNOSIS — Z72.0 TOBACCO ABUSE: ICD-10-CM

## 2024-11-25 PROCEDURE — 71271 CT THORAX LUNG CANCER SCR C-: CPT | Mod: TC | Performed by: RADIOLOGY

## 2024-12-01 DIAGNOSIS — D73.89 SPLENIC LESION: Primary | ICD-10-CM

## 2024-12-18 ENCOUNTER — ANCILLARY PROCEDURE (OUTPATIENT)
Dept: ULTRASOUND IMAGING | Facility: CLINIC | Age: 64
End: 2024-12-18
Attending: PHYSICIAN ASSISTANT
Payer: COMMERCIAL

## 2024-12-18 ENCOUNTER — ANCILLARY ORDERS (OUTPATIENT)
Dept: FAMILY MEDICINE | Facility: CLINIC | Age: 64
End: 2024-12-18

## 2024-12-18 DIAGNOSIS — D73.89 SPLENIC LESION: ICD-10-CM

## 2024-12-18 DIAGNOSIS — D73.89 SPLENIC LESION: Primary | ICD-10-CM

## 2024-12-18 PROCEDURE — 76700 US EXAM ABDOM COMPLETE: CPT | Mod: TC | Performed by: STUDENT IN AN ORGANIZED HEALTH CARE EDUCATION/TRAINING PROGRAM

## 2025-01-30 ENCOUNTER — VIRTUAL VISIT (OUTPATIENT)
Dept: FAMILY MEDICINE | Facility: CLINIC | Age: 65
End: 2025-01-30
Payer: COMMERCIAL

## 2025-01-30 DIAGNOSIS — R97.20 ELEVATED PROSTATE SPECIFIC ANTIGEN (PSA): ICD-10-CM

## 2025-01-30 DIAGNOSIS — D73.4 SPLENIC CYST: ICD-10-CM

## 2025-01-30 DIAGNOSIS — K82.4 GALLBLADDER POLYP: ICD-10-CM

## 2025-01-30 DIAGNOSIS — K76.0 METABOLIC DYSFUNCTION-ASSOCIATED STEATOTIC LIVER DISEASE (MASLD): Primary | ICD-10-CM

## 2025-01-30 RX ORDER — TIRZEPATIDE 2.5 MG/.5ML
2.5 INJECTION, SOLUTION SUBCUTANEOUS
Qty: 2 ML | Refills: 0 | Status: SHIPPED | OUTPATIENT
Start: 2025-01-30

## 2025-01-30 NOTE — PROGRESS NOTES
Jose is a 64 year old who is being evaluated via a billable video visit.    How would you like to obtain your AVS? Teo  If the video visit is dropped, the invitation should be resent by: Text to cell phone: [phone]  Will anyone else be joining your video visit? No        Subjective   Jose is a 64 year old, presenting for the following health issues:  Results (Ultrasound )      1/30/2025    11:11 AM   Additional Questions   Roomed by Teo   Accompanied by None     History of Present Illness       Reason for visit:  Review Ultrasound    He eats 0-1 servings of fruits and vegetables daily.He consumes 2 sweetened beverage(s) daily.He exercises with enough effort to increase his heart rate 9 or less minutes per day.  He exercises with enough effort to increase his heart rate 4 days per week.   He is taking medications regularly.     US results :  1.  Simple splenic cyst measuring 3 cm. Reassured patient. He denies any RUQ pains  2.  Small gallbladder polyps measuring up to 0.5 cm. Could consider  follow-up in 12 months. Reassured patient. Will continue to monitor  3.  Mild hepatic steatosis. Discussed treatment/management options.            Objective           Vitals:  No vitals were obtained today due to virtual visit.    Physical Exam   GENERAL: alert and no distress  EYES: Eyes grossly normal to inspection.  No discharge or erythema, or obvious scleral/conjunctival abnormalities.  RESP: No audible wheeze, cough, or visible cyanosis.    SKIN: Visible skin clear. No significant rash, abnormal pigmentation or lesions.  NEURO: Cranial nerves grossly intact.  Mentation and speech appropriate for age.  PSYCH: Appropriate affect, tone, and pace of words    Jose was seen today for results.    Diagnoses and all orders for this visit:    Metabolic dysfunction-associated steatotic liver disease (MASLD)  -     MOUNJARO 2.5 MG/0.5ML SOAJ; Inject 0.5 mLs (2.5 mg) subcutaneously every 7 days.  Continue statin.  Lower  fat, higher fiber diet and consistent exercise.  Minimize alcohol consumption   Splenic cyst    Gallbladder polyp    Elevated prostate specific antigen (PSA)  -     PSA, tumor marker; Future      Should gb polyps or splenic cyst continue to enlarge, will refer to general surgery to discuss treatment options.  Continue to work on a lower sugar/starch diet and more exercise.  Lower fat, higher fiber diet and consistent exercise.        Video-Visit Details    Type of service:  Video Visit   Originating Location (pt. Location): Home    Distant Location (provider location):  On-site  Platform used for Video Visit: Jammie  Signed Electronically by: Ed Aggarwal PA-C

## 2025-02-24 DIAGNOSIS — E11.69 TYPE 2 DIABETES MELLITUS WITH OTHER SPECIFIED COMPLICATION, WITHOUT LONG-TERM CURRENT USE OF INSULIN (H): Primary | ICD-10-CM

## 2025-02-24 RX ORDER — TIRZEPATIDE 5 MG/.5ML
5 INJECTION, SOLUTION SUBCUTANEOUS
Qty: 2 ML | Refills: 0 | Status: SHIPPED | OUTPATIENT
Start: 2025-02-24

## 2025-03-08 ENCOUNTER — HEALTH MAINTENANCE LETTER (OUTPATIENT)
Age: 65
End: 2025-03-08

## 2025-04-22 ENCOUNTER — MYC REFILL (OUTPATIENT)
Dept: FAMILY MEDICINE | Facility: CLINIC | Age: 65
End: 2025-04-22
Payer: COMMERCIAL

## 2025-04-22 DIAGNOSIS — E11.69 TYPE 2 DIABETES MELLITUS WITH OTHER SPECIFIED COMPLICATION, WITHOUT LONG-TERM CURRENT USE OF INSULIN (H): ICD-10-CM

## 2025-05-05 ENCOUNTER — OFFICE VISIT (OUTPATIENT)
Dept: FAMILY MEDICINE | Facility: CLINIC | Age: 65
End: 2025-05-05
Payer: COMMERCIAL

## 2025-05-05 VITALS
HEART RATE: 96 BPM | RESPIRATION RATE: 20 BRPM | TEMPERATURE: 98.5 F | OXYGEN SATURATION: 99 % | HEIGHT: 68 IN | SYSTOLIC BLOOD PRESSURE: 118 MMHG | WEIGHT: 148.2 LBS | BODY MASS INDEX: 22.46 KG/M2 | DIASTOLIC BLOOD PRESSURE: 66 MMHG

## 2025-05-05 DIAGNOSIS — R97.20 ELEVATED PROSTATE SPECIFIC ANTIGEN (PSA): ICD-10-CM

## 2025-05-05 DIAGNOSIS — Z13.6 SCREENING FOR CARDIOVASCULAR CONDITION: ICD-10-CM

## 2025-05-05 DIAGNOSIS — E11.69 TYPE 2 DIABETES MELLITUS WITH OTHER SPECIFIED COMPLICATION, WITHOUT LONG-TERM CURRENT USE OF INSULIN (H): Primary | ICD-10-CM

## 2025-05-05 DIAGNOSIS — R53.83 DECREASED ENERGY: ICD-10-CM

## 2025-05-05 LAB
EST. AVERAGE GLUCOSE BLD GHB EST-MCNC: 114 MG/DL
HBA1C MFR BLD: 5.6 % (ref 0–5.6)
HGB BLD-MCNC: 12.8 G/DL (ref 13.3–17.7)
PSA SERPL DL<=0.01 NG/ML-MCNC: 8.47 NG/ML (ref 0–4.5)
TSH SERPL DL<=0.005 MIU/L-ACNC: 1.98 UIU/ML (ref 0.3–4.2)

## 2025-05-05 PROCEDURE — 82570 ASSAY OF URINE CREATININE: CPT | Performed by: PHYSICIAN ASSISTANT

## 2025-05-05 PROCEDURE — 85018 HEMOGLOBIN: CPT | Performed by: PHYSICIAN ASSISTANT

## 2025-05-05 PROCEDURE — 84443 ASSAY THYROID STIM HORMONE: CPT | Performed by: PHYSICIAN ASSISTANT

## 2025-05-05 PROCEDURE — 3078F DIAST BP <80 MM HG: CPT | Performed by: PHYSICIAN ASSISTANT

## 2025-05-05 PROCEDURE — 99214 OFFICE O/P EST MOD 30 MIN: CPT | Performed by: PHYSICIAN ASSISTANT

## 2025-05-05 PROCEDURE — 36415 COLL VENOUS BLD VENIPUNCTURE: CPT | Performed by: PHYSICIAN ASSISTANT

## 2025-05-05 PROCEDURE — 3074F SYST BP LT 130 MM HG: CPT | Performed by: PHYSICIAN ASSISTANT

## 2025-05-05 PROCEDURE — 84153 ASSAY OF PSA TOTAL: CPT | Performed by: PHYSICIAN ASSISTANT

## 2025-05-05 PROCEDURE — 83036 HEMOGLOBIN GLYCOSYLATED A1C: CPT | Performed by: PHYSICIAN ASSISTANT

## 2025-05-05 PROCEDURE — 82043 UR ALBUMIN QUANTITATIVE: CPT | Performed by: PHYSICIAN ASSISTANT

## 2025-05-05 NOTE — PROGRESS NOTES
"    Subjective   Jose is a 64 year old, presenting for the following health issues:  Diabetes (Review blood sugar levels) and Health Maintenance (Patient is not fasting)        5/5/2025     4:11 PM   Additional Questions   Roomed by Giulia Trevino CMA   Accompanied by None         5/5/2025     4:11 PM   Patient Reported Additional Medications   Patient reports taking the following new medications none     History of Present Illness       Diabetes:   He presents for follow up of diabetes.    He is not checking blood glucose.         He has no concerns regarding his diabetes at this time.   He is not experiencing numbness or burning in feet, excessive thirst, blurry vision, weight changes or redness, sores or blisters on feet.       He consumes 2 sweetened beverage(s) daily. He exercises with enough effort to increase his heart rate 3 or less days per week.       Dec energy and some weight loss. Due to mounjaro. Dec appetite. No chest pain/sob/palpitations/dizziness/ha's    History of elevated psa. No irritative/obstructive voiding symptoms.        Review of Systems  Constitutional, HEENT, cardiovascular, pulmonary, GI, , musculoskeletal, neuro, skin, endocrine and psych systems are negative, except as otherwise noted.      Objective    /66   Pulse 96   Temp 98.5  F (36.9  C) (Oral)   Resp 20   Ht 1.721 m (5' 7.75\")   Wt 67.2 kg (148 lb 3.2 oz)   SpO2 99%   BMI 22.70 kg/m    Body mass index is 22.7 kg/m .  Physical Exam         Eye exam - right eye normal lid, conjunctiva, cornea, pupil and fundus, left eye normal lid, conjunctiva, cornea, pupil and fundus.  Thyroid not palpable, not enlarged, no nodules detected.  CHEST:chest clear to IPPA, no tachypnea, retractions or cyanosis, and S1, S2 normal, no murmur, no gallop, rate regular.    Jose was seen today for diabetes and health maintenance.    Diagnoses and all orders for this visit:    Type 2 diabetes mellitus with other specified complication, " without long-term current use of insulin (H)  -     HEMOGLOBIN A1C; Future  -     Albumin Random Urine Quantitative with Creat Ratio; Future  -     HEMOGLOBIN A1C  -     Albumin Random Urine Quantitative with Creat Ratio  -     empagliflozin (JARDIANCE) 25 MG TABS tablet; Take 1 tablet (25 mg) by mouth daily.    Screening for cardiovascular condition  -     PSA, tumor marker; Future  -     PSA, tumor marker    Decreased energy  -     Testosterone, total; Future  -     Hemoglobin; Future  -     TSH with free T4 reflex; Future  -     Testosterone, total  -     Hemoglobin  -     TSH with free T4 reflex    Other orders  -     REVIEW OF HEALTH MAINTENANCE PROTOCOL ORDERS      Discontinue mounjaro. Start jardiance  Lower fat, higher fiber diet and consistent exercise.  Continue to work on a lower sugar/starch diet and more exercise.  Recheck in 6 mos     Signed Electronically by: Ed Aggarwal PA-C

## 2025-05-06 LAB
CREAT UR-MCNC: 84 MG/DL
MICROALBUMIN UR-MCNC: <12 MG/L
MICROALBUMIN/CREAT UR: NORMAL MG/G{CREAT}

## 2025-05-14 ENCOUNTER — HOSPITAL ENCOUNTER (OUTPATIENT)
Dept: MRI IMAGING | Facility: CLINIC | Age: 65
Discharge: HOME | End: 2025-05-14
Attending: PHYSICIAN ASSISTANT
Payer: COMMERCIAL

## 2025-05-14 DIAGNOSIS — R97.20 ELEVATED PROSTATE SPECIFIC ANTIGEN (PSA): ICD-10-CM

## 2025-05-14 PROCEDURE — 72197 MRI PELVIS W/O & W/DYE: CPT

## 2025-05-14 PROCEDURE — 255N000002 HC RX 255 OP 636: Performed by: PHYSICIAN ASSISTANT

## 2025-05-14 PROCEDURE — A9585 GADOBUTROL INJECTION: HCPCS | Performed by: PHYSICIAN ASSISTANT

## 2025-05-14 PROCEDURE — 258N000003 HC RX IP 258 OP 636: Performed by: PHYSICIAN ASSISTANT

## 2025-05-14 RX ORDER — GADOBUTROL 604.72 MG/ML
6.5 INJECTION INTRAVENOUS ONCE
Status: COMPLETED | OUTPATIENT
Start: 2025-05-14 | End: 2025-05-14

## 2025-05-14 RX ADMIN — GADOBUTROL 6.5 ML: 604.72 INJECTION INTRAVENOUS at 16:56

## 2025-05-14 RX ADMIN — SODIUM CHLORIDE 50 ML: 9 INJECTION, SOLUTION INTRAVENOUS at 16:56

## 2025-05-15 ENCOUNTER — MYC MEDICAL ADVICE (OUTPATIENT)
Dept: FAMILY MEDICINE | Facility: CLINIC | Age: 65
End: 2025-05-15
Payer: COMMERCIAL

## 2025-05-19 ENCOUNTER — RESULTS FOLLOW-UP (OUTPATIENT)
Dept: FAMILY MEDICINE | Facility: CLINIC | Age: 65
End: 2025-05-19

## 2025-05-20 ENCOUNTER — PATIENT OUTREACH (OUTPATIENT)
Dept: CARE COORDINATION | Facility: CLINIC | Age: 65
End: 2025-05-20
Payer: COMMERCIAL

## 2025-05-22 ENCOUNTER — PATIENT OUTREACH (OUTPATIENT)
Dept: CARE COORDINATION | Facility: CLINIC | Age: 65
End: 2025-05-22
Payer: COMMERCIAL

## 2025-07-10 DIAGNOSIS — R97.20 ELEVATED PROSTATE SPECIFIC ANTIGEN (PSA): Primary | ICD-10-CM

## 2025-07-12 ENCOUNTER — LAB (OUTPATIENT)
Dept: LAB | Facility: CLINIC | Age: 65
End: 2025-07-12
Payer: COMMERCIAL

## 2025-07-12 DIAGNOSIS — R97.20 ELEVATED PROSTATE SPECIFIC ANTIGEN (PSA): ICD-10-CM

## 2025-07-12 DIAGNOSIS — Z13.6 SCREENING FOR CARDIOVASCULAR CONDITION: Primary | ICD-10-CM

## 2025-07-12 DIAGNOSIS — E11.9 DIABETES MELLITUS, TYPE 2 (H): ICD-10-CM

## 2025-07-12 LAB
ANION GAP SERPL CALCULATED.3IONS-SCNC: 9 MMOL/L (ref 7–15)
BUN SERPL-MCNC: 19.3 MG/DL (ref 8–23)
CALCIUM SERPL-MCNC: 9.9 MG/DL (ref 8.8–10.4)
CHLORIDE SERPL-SCNC: 105 MMOL/L (ref 98–107)
CHOLEST SERPL-MCNC: 145 MG/DL
CREAT SERPL-MCNC: 1.11 MG/DL (ref 0.67–1.17)
EGFRCR SERPLBLD CKD-EPI 2021: 75 ML/MIN/1.73M2
FASTING STATUS PATIENT QL REPORTED: NO
FASTING STATUS PATIENT QL REPORTED: NO
GLUCOSE SERPL-MCNC: 136 MG/DL (ref 70–99)
HCO3 SERPL-SCNC: 28 MMOL/L (ref 22–29)
HDLC SERPL-MCNC: 45 MG/DL
LAB ORDER RESULT STATUS: NORMAL
LDLC SERPL CALC-MCNC: 73 MG/DL
Lab: NORMAL
NONHDLC SERPL-MCNC: 100 MG/DL
PERFORMING LABORATORY: NORMAL
POTASSIUM SERPL-SCNC: 4.1 MMOL/L (ref 3.4–5.3)
SODIUM SERPL-SCNC: 142 MMOL/L (ref 135–145)
TEST NAME: NORMAL
TRIGL SERPL-MCNC: 135 MG/DL

## 2025-07-12 PROCEDURE — 99000 SPECIMEN HANDLING OFFICE-LAB: CPT

## 2025-07-12 PROCEDURE — 84154 ASSAY OF PSA FREE: CPT

## 2025-07-12 PROCEDURE — 86316 IMMUNOASSAY TUMOR OTHER: CPT

## 2025-07-13 ENCOUNTER — MYC MEDICAL ADVICE (OUTPATIENT)
Dept: FAMILY MEDICINE | Facility: CLINIC | Age: 65
End: 2025-07-13
Payer: COMMERCIAL

## 2025-07-16 LAB
MAYO MISC RESULT: ABNORMAL
SCANNED LAB RESULT: NORMAL
TEST NAME: NORMAL

## 2025-07-16 NOTE — TELEPHONE ENCOUNTER
Called and spoke to patient. Got orders faxed from [facility] and spoke to lab department they are able to do this with orders.  Renate WASHINGTON [facility],  [place]

## 2025-08-02 ENCOUNTER — LAB (OUTPATIENT)
Dept: LAB | Facility: CLINIC | Age: 65
End: 2025-08-02
Payer: COMMERCIAL

## 2025-08-02 DIAGNOSIS — R97.20 ELEVATED PROSTATE SPECIFIC ANTIGEN (PSA): ICD-10-CM

## 2025-08-02 DIAGNOSIS — R63.4 WEIGHT LOSS: ICD-10-CM

## 2025-08-02 DIAGNOSIS — E11.9 DIABETES MELLITUS, TYPE 2 (H): Primary | ICD-10-CM

## 2025-08-02 LAB
BASOPHILS # BLD AUTO: 0 10E3/UL (ref 0–0.2)
BASOPHILS NFR BLD AUTO: 0 %
EOSINOPHIL # BLD AUTO: 0.1 10E3/UL (ref 0–0.7)
EOSINOPHIL NFR BLD AUTO: 1 %
ERYTHROCYTE [DISTWIDTH] IN BLOOD BY AUTOMATED COUNT: 12 % (ref 10–15)
HCT VFR BLD AUTO: 40.7 % (ref 40–53)
HGB BLD-MCNC: 13.7 G/DL (ref 13.3–17.7)
IMM GRANULOCYTES # BLD: 0 10E3/UL
IMM GRANULOCYTES NFR BLD: 0 %
LYMPHOCYTES # BLD AUTO: 1.9 10E3/UL (ref 0.8–5.3)
LYMPHOCYTES NFR BLD AUTO: 23 %
MCH RBC QN AUTO: 31.7 PG (ref 26.5–33)
MCHC RBC AUTO-ENTMCNC: 33.7 G/DL (ref 31.5–36.5)
MCV RBC AUTO: 94 FL (ref 78–100)
MONOCYTES # BLD AUTO: 0.8 10E3/UL (ref 0–1.3)
MONOCYTES NFR BLD AUTO: 9 %
NEUTROPHILS # BLD AUTO: 5.4 10E3/UL (ref 1.6–8.3)
NEUTROPHILS NFR BLD AUTO: 66 %
PLATELET # BLD AUTO: 218 10E3/UL (ref 150–450)
RBC # BLD AUTO: 4.32 10E6/UL (ref 4.4–5.9)
WBC # BLD AUTO: 8.2 10E3/UL (ref 4–11)

## 2025-08-02 PROCEDURE — 85025 COMPLETE CBC W/AUTO DIFF WBC: CPT

## 2025-08-02 PROCEDURE — 36415 COLL VENOUS BLD VENIPUNCTURE: CPT

## 2025-08-24 ENCOUNTER — HEALTH MAINTENANCE LETTER (OUTPATIENT)
Age: 65
End: 2025-08-24

## 2025-08-27 DIAGNOSIS — E78.5 HYPERLIPIDEMIA LDL GOAL <130: ICD-10-CM

## 2025-08-27 RX ORDER — SIMVASTATIN 20 MG
20 TABLET ORAL AT BEDTIME
Qty: 90 TABLET | Refills: 1 | Status: SHIPPED | OUTPATIENT
Start: 2025-08-27